# Patient Record
Sex: FEMALE | Race: BLACK OR AFRICAN AMERICAN | URBAN - METROPOLITAN AREA
[De-identification: names, ages, dates, MRNs, and addresses within clinical notes are randomized per-mention and may not be internally consistent; named-entity substitution may affect disease eponyms.]

---

## 2018-07-06 ENCOUNTER — HISTORICAL (OUTPATIENT)
Dept: ADMINISTRATIVE | Facility: HOSPITAL | Age: 63
End: 2018-07-06

## 2018-09-17 ENCOUNTER — HISTORICAL (OUTPATIENT)
Dept: ADMINISTRATIVE | Facility: HOSPITAL | Age: 63
End: 2018-09-17

## 2018-09-17 LAB
ABS NEUT (OLG): 2.5
ALBUMIN SERPL-MCNC: 4.3 GM/DL (ref 3.4–5)
ALBUMIN/GLOB SERPL: 1.79 {RATIO} (ref 1.5–2.5)
ALP SERPL-CCNC: 65 UNIT/L (ref 38–126)
ALT SERPL-CCNC: 13 UNIT/L (ref 7–52)
APPEARANCE, UA: CLEAR
AST SERPL-CCNC: 15 UNIT/L (ref 15–37)
BACTERIA #/AREA URNS AUTO: ABNORMAL /HPF
BILIRUB SERPL-MCNC: 0.4 MG/DL (ref 0.2–1)
BILIRUB UR QL STRIP: NEGATIVE MG/DL
BILIRUBIN DIRECT+TOT PNL SERPL-MCNC: 0.1 MG/DL (ref 0–0.5)
BILIRUBIN DIRECT+TOT PNL SERPL-MCNC: 0.3 MG/DL
BUN SERPL-MCNC: 18 MG/DL (ref 7–18)
CALCIUM SERPL-MCNC: 8.8 MG/DL (ref 8.5–10)
CHLORIDE SERPL-SCNC: 105 MMOL/L (ref 98–107)
CHOLEST SERPL-MCNC: 193 MG/DL (ref 0–200)
CHOLEST/HDLC SERPL: 3.6 {RATIO}
CO2 SERPL-SCNC: 29 MMOL/L (ref 21–32)
COLOR UR: YELLOW
CREAT SERPL-MCNC: 0.96 MG/DL (ref 0.6–1.3)
CREAT UR-MCNC: 100 MG/DL
DEPRECATED CALCIDIOL+CALCIFEROL SERPL-MC: 35.3 NG/ML (ref 30–80)
ERYTHROCYTE [DISTWIDTH] IN BLOOD BY AUTOMATED COUNT: 14.8 % (ref 11.5–17)
EST. AVERAGE GLUCOSE BLD GHB EST-MCNC: 117 MG/DL
GLOBULIN SER-MCNC: 2.4 GM/DL (ref 1.2–3)
GLUCOSE (UA): NEGATIVE MG/DL
GLUCOSE SERPL-MCNC: 106 MG/DL (ref 74–106)
HBA1C MFR BLD: 5.7 % (ref 4.4–6.4)
HCT VFR BLD AUTO: 40.4 % (ref 37–47)
HDLC SERPL-MCNC: 54 MG/DL (ref 35–60)
HGB BLD-MCNC: 12.8 GM/DL (ref 12–16)
HGB UR QL STRIP: ABNORMAL UNIT/L
KETONES UR QL STRIP: NEGATIVE MG/DL
LDLC SERPL CALC-MCNC: 151 MG/DL (ref 0–129)
LEUKOCYTE ESTERASE UR QL STRIP: NEGATIVE UNIT/L
LYMPHOCYTES # BLD AUTO: 2.1 X10(3)/MCL (ref 0.6–3.4)
LYMPHOCYTES NFR BLD AUTO: 41.3 % (ref 13–40)
MCH RBC QN AUTO: 27.5 PG (ref 27–31.2)
MCHC RBC AUTO-ENTMCNC: 32 GM/DL (ref 32–36)
MCV RBC AUTO: 87 FL (ref 80–94)
MICROALBUMIN UR-MCNC: 10 MG/L
MICROALBUMIN/CREAT RATIO PNL UR: <30 MG/GM
MONOCYTES # BLD AUTO: 0.4 X10(3)/MCL (ref 0–1.8)
MONOCYTES NFR BLD AUTO: 8.2 % (ref 0.1–24)
NEUTROPHILS NFR BLD AUTO: 50.5 % (ref 47–80)
NITRITE UR QL STRIP.AUTO: NEGATIVE
PH UR STRIP: 7 [PH]
PLATELET # BLD AUTO: 273 X10(3)/MCL (ref 130–400)
PMV BLD AUTO: 9.6 FL
POTASSIUM SERPL-SCNC: 3.8 MMOL/L (ref 3.5–5.1)
PROT SERPL-MCNC: 6.7 GM/DL (ref 6.4–8.2)
PROT UR QL STRIP: NEGATIVE MG/DL
RBC # BLD AUTO: 4.66 X10(6)/MCL (ref 4.2–5.4)
RBC #/AREA URNS HPF: ABNORMAL /HPF
SODIUM SERPL-SCNC: 143 MMOL/L (ref 136–145)
SP GR UR STRIP: 1.01
SQUAMOUS EPITHELIAL, UA: ABNORMAL /LPF
TRIGL SERPL-MCNC: 97 MG/DL (ref 30–150)
TSH SERPL-ACNC: 2.05 MIU/ML (ref 0.35–4.94)
UROBILINOGEN UR STRIP-ACNC: 0.2 MG/DL
VLDLC SERPL CALC-MCNC: 19.4 MG/DL
WBC # SPEC AUTO: 5 X10(3)/MCL (ref 4.5–11.5)
WBC #/AREA URNS AUTO: ABNORMAL /[HPF]

## 2019-05-20 ENCOUNTER — HISTORICAL (OUTPATIENT)
Dept: ADMINISTRATIVE | Facility: HOSPITAL | Age: 64
End: 2019-05-20

## 2019-05-20 LAB
ALBUMIN SERPL-MCNC: 4.1 GM/DL (ref 3.4–5)
ALBUMIN/GLOB SERPL: 2.16 {RATIO} (ref 1.5–2.5)
ALP SERPL-CCNC: 60 UNIT/L (ref 38–126)
ALT SERPL-CCNC: 10 UNIT/L (ref 7–52)
AST SERPL-CCNC: 12 UNIT/L (ref 15–37)
BILIRUB SERPL-MCNC: 0.4 MG/DL (ref 0.2–1)
BILIRUBIN DIRECT+TOT PNL SERPL-MCNC: 0.1 MG/DL (ref 0–0.5)
BILIRUBIN DIRECT+TOT PNL SERPL-MCNC: 0.3 MG/DL
BUN SERPL-MCNC: 20 MG/DL (ref 7–18)
CALCIUM SERPL-MCNC: 9 MG/DL (ref 8.5–10)
CHLORIDE SERPL-SCNC: 102 MMOL/L (ref 98–107)
CHOLEST SERPL-MCNC: 229 MG/DL (ref 0–200)
CHOLEST/HDLC SERPL: 4.7 {RATIO}
CO2 SERPL-SCNC: 31 MMOL/L (ref 21–32)
CREAT SERPL-MCNC: 0.78 MG/DL (ref 0.6–1.3)
DEPRECATED CALCIDIOL+CALCIFEROL SERPL-MC: 29 NG/ML (ref 30–80)
EST. AVERAGE GLUCOSE BLD GHB EST-MCNC: 111 MG/DL
GLOBULIN SER-MCNC: 1.9 GM/DL (ref 1.2–3)
GLUCOSE SERPL-MCNC: 103 MG/DL (ref 74–106)
HBA1C MFR BLD: 5.5 % (ref 4.4–6.4)
HDLC SERPL-MCNC: 49 MG/DL (ref 35–60)
LDLC SERPL CALC-MCNC: 166 MG/DL (ref 0–129)
POTASSIUM SERPL-SCNC: 4 MMOL/L (ref 3.5–5.1)
PROT SERPL-MCNC: 6 GM/DL (ref 6.4–8.2)
SODIUM SERPL-SCNC: 142 MMOL/L (ref 136–145)
TRIGL SERPL-MCNC: 109 MG/DL (ref 30–150)
VLDLC SERPL CALC-MCNC: 21.8 MG/DL

## 2019-06-06 ENCOUNTER — HISTORICAL (OUTPATIENT)
Dept: ADMINISTRATIVE | Facility: HOSPITAL | Age: 64
End: 2019-06-06

## 2019-06-06 LAB — URATE SERPL-MCNC: 7 MG/DL (ref 2.6–7.2)

## 2020-02-10 ENCOUNTER — HISTORICAL (OUTPATIENT)
Dept: ADMINISTRATIVE | Facility: HOSPITAL | Age: 65
End: 2020-02-10

## 2020-02-10 LAB
ABS NEUT (OLG): 2.4 X10(3)/MCL (ref 2.1–9.2)
ALBUMIN SERPL-MCNC: 4.1 GM/DL (ref 3.4–5)
ALBUMIN/GLOB SERPL: 1.86 {RATIO} (ref 1.5–2.5)
ALP SERPL-CCNC: 68 UNIT/L (ref 38–126)
ALT SERPL-CCNC: 11 UNIT/L (ref 7–52)
APPEARANCE, UA: CLEAR
AST SERPL-CCNC: 14 UNIT/L (ref 15–37)
BACTERIA #/AREA URNS AUTO: ABNORMAL /HPF
BILIRUB SERPL-MCNC: 0.4 MG/DL (ref 0.2–1)
BILIRUB UR QL STRIP: NEGATIVE MG/DL
BILIRUBIN DIRECT+TOT PNL SERPL-MCNC: 0.1 MG/DL (ref 0–0.5)
BILIRUBIN DIRECT+TOT PNL SERPL-MCNC: 0.3 MG/DL
BUN SERPL-MCNC: 15 MG/DL (ref 7–18)
CALCIUM SERPL-MCNC: 8.9 MG/DL (ref 8.5–10)
CHLORIDE SERPL-SCNC: 101 MMOL/L (ref 98–107)
CHOLEST SERPL-MCNC: 216 MG/DL (ref 0–200)
CHOLEST/HDLC SERPL: 3.9 {RATIO}
CO2 SERPL-SCNC: 26 MMOL/L (ref 21–32)
COLOR UR: YELLOW
CREAT SERPL-MCNC: 0.98 MG/DL (ref 0.6–1.3)
DEPRECATED CALCIDIOL+CALCIFEROL SERPL-MC: 42.8 NG/ML (ref 30–80)
ERYTHROCYTE [DISTWIDTH] IN BLOOD BY AUTOMATED COUNT: 15.2 % (ref 11.5–17)
EST. AVERAGE GLUCOSE BLD GHB EST-MCNC: 117 MG/DL
GLOBULIN SER-MCNC: 2.2 GM/DL (ref 1.2–3)
GLUCOSE (UA): NEGATIVE MG/DL
GLUCOSE SERPL-MCNC: 103 MG/DL (ref 74–106)
HBA1C MFR BLD: 5.7 % (ref 4.4–6.4)
HCT VFR BLD AUTO: 40.9 % (ref 37–47)
HDLC SERPL-MCNC: 55 MG/DL (ref 35–60)
HGB BLD-MCNC: 13.4 GM/DL (ref 12–16)
HGB UR QL STRIP: ABNORMAL UNIT/L
KETONES UR QL STRIP: NEGATIVE MG/DL
LDLC SERPL CALC-MCNC: 156 MG/DL (ref 0–129)
LEUKOCYTE ESTERASE UR QL STRIP: NEGATIVE UNIT/L
LYMPHOCYTES # BLD AUTO: 1.7 X10(3)/MCL (ref 0.6–3.4)
LYMPHOCYTES NFR BLD AUTO: 37.8 % (ref 13–40)
MCH RBC QN AUTO: 26.7 PG (ref 27–31.2)
MCHC RBC AUTO-ENTMCNC: 33 GM/DL (ref 32–36)
MCV RBC AUTO: 82 FL (ref 80–94)
MONOCYTES # BLD AUTO: 0.4 X10(3)/MCL (ref 0.1–1.3)
MONOCYTES NFR BLD AUTO: 9.8 % (ref 0.1–24)
NEUTROPHILS NFR BLD AUTO: 52.4 % (ref 47–80)
NITRITE UR QL STRIP.AUTO: NEGATIVE
PH UR STRIP: 6 [PH]
PLATELET # BLD AUTO: 284 X10(3)/MCL (ref 130–400)
PMV BLD AUTO: 9.9 FL (ref 9.4–12.4)
POTASSIUM SERPL-SCNC: 3.5 MMOL/L (ref 3.5–5.1)
PROT SERPL-MCNC: 6.3 GM/DL (ref 6.4–8.2)
PROT UR QL STRIP: NEGATIVE MG/DL
RBC # BLD AUTO: 5.02 X10(6)/MCL (ref 4.2–5.4)
RBC #/AREA URNS HPF: ABNORMAL /HPF
SODIUM SERPL-SCNC: 140 MMOL/L (ref 136–145)
SP GR UR STRIP: 1.01
SQUAMOUS EPITHELIAL, UA: ABNORMAL /LPF
TRIGL SERPL-MCNC: 132 MG/DL (ref 30–150)
TSH SERPL-ACNC: 2 MIU/ML (ref 0.35–4.94)
UROBILINOGEN UR STRIP-ACNC: 0.2 MG/DL
VLDLC SERPL CALC-MCNC: 26.4 MG/DL
WBC # SPEC AUTO: 4.5 X10(3)/MCL (ref 4.5–11.5)
WBC #/AREA URNS AUTO: ABNORMAL /[HPF]

## 2020-03-20 ENCOUNTER — HISTORICAL (OUTPATIENT)
Dept: ADMINISTRATIVE | Facility: HOSPITAL | Age: 65
End: 2020-03-20

## 2020-03-20 LAB
ALBUMIN SERPL-MCNC: 3.8 GM/DL (ref 3.4–5)
ALBUMIN/GLOB SERPL: 1.65 {RATIO} (ref 1.5–2.5)
ALP SERPL-CCNC: 54 UNIT/L (ref 38–126)
ALT SERPL-CCNC: 10 UNIT/L (ref 7–52)
AST SERPL-CCNC: 15 UNIT/L (ref 15–37)
BILIRUB SERPL-MCNC: 0.4 MG/DL (ref 0.2–1)
BILIRUBIN DIRECT+TOT PNL SERPL-MCNC: 0.1 MG/DL (ref 0–0.5)
BILIRUBIN DIRECT+TOT PNL SERPL-MCNC: 0.3 MG/DL
BUN SERPL-MCNC: 16 MG/DL (ref 7–18)
CALCIUM SERPL-MCNC: 8.9 MG/DL (ref 8.5–10)
CHLORIDE SERPL-SCNC: 103 MMOL/L (ref 98–107)
CO2 SERPL-SCNC: 29 MMOL/L (ref 21–32)
CREAT SERPL-MCNC: 0.94 MG/DL (ref 0.6–1.3)
GLOBULIN SER-MCNC: 2.3 GM/DL (ref 1.2–3)
GLUCOSE SERPL-MCNC: 101 MG/DL (ref 74–106)
POTASSIUM SERPL-SCNC: 4.1 MMOL/L (ref 3.5–5.1)
PROT SERPL-MCNC: 6.1 GM/DL (ref 6.4–8.2)
SODIUM SERPL-SCNC: 139 MMOL/L (ref 136–145)
URATE SERPL-MCNC: 5.9 MG/DL (ref 2.6–7.2)

## 2020-08-04 ENCOUNTER — HISTORICAL (OUTPATIENT)
Dept: ADMINISTRATIVE | Facility: HOSPITAL | Age: 65
End: 2020-08-04

## 2020-08-04 LAB
ALBUMIN SERPL-MCNC: 4.3 GM/DL (ref 3.4–5)
ALBUMIN/GLOB SERPL: 1.87 {RATIO} (ref 1.5–2.5)
ALP SERPL-CCNC: 77 UNIT/L (ref 38–126)
ALT SERPL-CCNC: 11 UNIT/L (ref 7–52)
AST SERPL-CCNC: 18 UNIT/L (ref 15–37)
BILIRUB SERPL-MCNC: 0.4 MG/DL (ref 0.2–1)
BILIRUBIN DIRECT+TOT PNL SERPL-MCNC: 0.1 MG/DL (ref 0–0.5)
BILIRUBIN DIRECT+TOT PNL SERPL-MCNC: 0.3 MG/DL
BUN SERPL-MCNC: 15 MG/DL (ref 7–18)
CALCIUM SERPL-MCNC: 9.1 MG/DL (ref 8.5–10)
CHLORIDE SERPL-SCNC: 102 MMOL/L (ref 98–107)
CHOLEST SERPL-MCNC: 246 MG/DL (ref 0–200)
CHOLEST/HDLC SERPL: 4.6 {RATIO}
CO2 SERPL-SCNC: 28 MMOL/L (ref 21–32)
CREAT SERPL-MCNC: 0.84 MG/DL (ref 0.6–1.3)
DEPRECATED CALCIDIOL+CALCIFEROL SERPL-MC: 40.2 NG/ML (ref 30–80)
EST. AVERAGE GLUCOSE BLD GHB EST-MCNC: 114 MG/DL
GLOBULIN SER-MCNC: 2.3 GM/DL (ref 1.2–3)
GLUCOSE SERPL-MCNC: 100 MG/DL (ref 74–106)
HBA1C MFR BLD: 5.6 % (ref 4.4–6.4)
HDLC SERPL-MCNC: 54 MG/DL (ref 35–60)
LDLC SERPL CALC-MCNC: 161 MG/DL (ref 0–129)
POTASSIUM SERPL-SCNC: 3.6 MMOL/L (ref 3.5–5.1)
PROT SERPL-MCNC: 6.6 GM/DL (ref 6.4–8.2)
SODIUM SERPL-SCNC: 144 MMOL/L (ref 136–145)
TRIGL SERPL-MCNC: 203 MG/DL (ref 30–150)
VLDLC SERPL CALC-MCNC: 40.6 MG/DL

## 2021-02-10 ENCOUNTER — HISTORICAL (OUTPATIENT)
Dept: ADMINISTRATIVE | Facility: HOSPITAL | Age: 66
End: 2021-02-10

## 2021-02-10 LAB
ABS NEUT (OLG): 2.3 X10(3)/MCL (ref 2.1–9.2)
ALBUMIN SERPL-MCNC: 4.3 GM/DL (ref 3.4–5)
ALBUMIN/GLOB SERPL: 1.95 {RATIO} (ref 1.5–2.5)
ALP SERPL-CCNC: 67 UNIT/L (ref 38–126)
ALT SERPL-CCNC: 8 UNIT/L (ref 7–52)
APPEARANCE, UA: CLEAR
AST SERPL-CCNC: 15 UNIT/L (ref 15–37)
BACTERIA #/AREA URNS AUTO: NORMAL /HPF
BILIRUB SERPL-MCNC: 0.5 MG/DL (ref 0.2–1)
BILIRUB UR QL STRIP: NEGATIVE MG/DL
BILIRUBIN DIRECT+TOT PNL SERPL-MCNC: 0.1 MG/DL (ref 0–0.5)
BILIRUBIN DIRECT+TOT PNL SERPL-MCNC: 0.4 MG/DL
BUN SERPL-MCNC: 19 MG/DL (ref 7–18)
CALCIUM SERPL-MCNC: 9.6 MG/DL (ref 8.5–10)
CHLORIDE SERPL-SCNC: 102 MMOL/L (ref 98–107)
CHOLEST SERPL-MCNC: 225 MG/DL (ref 0–200)
CHOLEST/HDLC SERPL: 4.5 {RATIO}
CO2 SERPL-SCNC: 34 MMOL/L (ref 21–32)
COLOR UR: YELLOW
CREAT SERPL-MCNC: 0.9 MG/DL (ref 0.6–1.3)
DEPRECATED CALCIDIOL+CALCIFEROL SERPL-MC: 48.2 NG/ML (ref 30–80)
ERYTHROCYTE [DISTWIDTH] IN BLOOD BY AUTOMATED COUNT: 15.1 % (ref 11.5–17)
EST. AVERAGE GLUCOSE BLD GHB EST-MCNC: 108 MG/DL
GLOBULIN SER-MCNC: 1.9 GM/DL (ref 1.2–3)
GLUCOSE (UA): NEGATIVE MG/DL
GLUCOSE SERPL-MCNC: 99 MG/DL (ref 74–106)
HBA1C MFR BLD: 5.4 % (ref 4.4–6.4)
HCT VFR BLD AUTO: 39 % (ref 37–47)
HDLC SERPL-MCNC: 50 MG/DL (ref 35–60)
HGB BLD-MCNC: 13.2 GM/DL (ref 12–16)
HGB UR QL STRIP: NEGATIVE UNIT/L
KETONES UR QL STRIP: NEGATIVE MG/DL
LDLC SERPL CALC-MCNC: 163 MG/DL (ref 0–129)
LEUKOCYTE ESTERASE UR QL STRIP: NEGATIVE UNIT/L
LYMPHOCYTES # BLD AUTO: 1.4 X10(3)/MCL (ref 0.6–3.4)
LYMPHOCYTES NFR BLD AUTO: 35.7 % (ref 13–40)
MCH RBC QN AUTO: 28.1 PG (ref 27–31.2)
MCHC RBC AUTO-ENTMCNC: 34 GM/DL (ref 32–36)
MCV RBC AUTO: 83 FL (ref 80–94)
MONOCYTES # BLD AUTO: 0.3 X10(3)/MCL (ref 0.1–1.3)
MONOCYTES NFR BLD AUTO: 8.4 % (ref 0.1–24)
NEUTROPHILS NFR BLD AUTO: 55.9 % (ref 47–80)
NITRITE UR QL STRIP.AUTO: NEGATIVE
PH UR STRIP: 7 [PH]
PLATELET # BLD AUTO: 266 X10(3)/MCL (ref 130–400)
PMV BLD AUTO: 9.8 FL (ref 9.4–12.4)
POTASSIUM SERPL-SCNC: 4.1 MMOL/L (ref 3.5–5.1)
PROT SERPL-MCNC: 6.5 GM/DL (ref 6.4–8.2)
PROT UR QL STRIP: NEGATIVE MG/DL
RBC # BLD AUTO: 4.69 X10(6)/MCL (ref 4.2–5.4)
RBC #/AREA URNS HPF: NORMAL /HPF
SODIUM SERPL-SCNC: 143 MMOL/L (ref 136–145)
SP GR UR STRIP: 1.02
SQUAMOUS EPITHELIAL, UA: NORMAL /LPF
TRIGL SERPL-MCNC: 164 MG/DL (ref 30–150)
TSH SERPL-ACNC: 1.64 MIU/ML (ref 0.35–4.94)
UROBILINOGEN UR STRIP-ACNC: 0.2 MG/DL
VLDLC SERPL CALC-MCNC: 32.8 MG/DL
WBC # SPEC AUTO: 4 X10(3)/MCL (ref 4.5–11.5)
WBC #/AREA URNS AUTO: NORMAL /[HPF]

## 2021-07-12 ENCOUNTER — HISTORICAL (OUTPATIENT)
Dept: ADMINISTRATIVE | Facility: HOSPITAL | Age: 66
End: 2021-07-12

## 2021-07-12 LAB
APPEARANCE, UA: CLEAR
BACTERIA #/AREA URNS AUTO: ABNORMAL /HPF
BILIRUB UR QL STRIP: NEGATIVE MG/DL
COLOR UR: YELLOW
GLUCOSE (UA): NEGATIVE MG/DL
HGB UR QL STRIP: ABNORMAL UNIT/L
KETONES UR QL STRIP: NEGATIVE MG/DL
LEUKOCYTE ESTERASE UR QL STRIP: NEGATIVE UNIT/L
NITRITE UR QL STRIP.AUTO: NEGATIVE
PH UR STRIP: 5.5 [PH]
PROT UR QL STRIP: NEGATIVE MG/DL
RBC #/AREA URNS HPF: ABNORMAL /HPF
SP GR UR STRIP: 1.01
SQUAMOUS EPITHELIAL, UA: ABNORMAL /LPF
UROBILINOGEN UR STRIP-ACNC: 0.2 MG/DL
WBC #/AREA URNS AUTO: ABNORMAL /[HPF]

## 2021-07-14 LAB — FINAL CULTURE: NORMAL

## 2021-08-11 ENCOUNTER — HISTORICAL (OUTPATIENT)
Dept: ADMINISTRATIVE | Facility: HOSPITAL | Age: 66
End: 2021-08-11

## 2021-08-11 LAB
CHOLEST SERPL-MCNC: 238 MG/DL (ref 0–200)
CHOLEST/HDLC SERPL: 4.2 {RATIO}
DEPRECATED CALCIDIOL+CALCIFEROL SERPL-MC: 42.7 NG/ML (ref 30–80)
EST. AVERAGE GLUCOSE BLD GHB EST-MCNC: 108 MG/DL
HBA1C MFR BLD: 5.4 % (ref 4.4–6.4)
HDLC SERPL-MCNC: 56 MG/DL (ref 35–60)
LDLC SERPL CALC-MCNC: 144 MG/DL (ref 0–129)
TRIGL SERPL-MCNC: 186 MG/DL (ref 30–150)
VLDLC SERPL CALC-MCNC: 37.2 MG/DL

## 2021-12-29 ENCOUNTER — HISTORICAL (OUTPATIENT)
Dept: ADMINISTRATIVE | Facility: HOSPITAL | Age: 66
End: 2021-12-29

## 2021-12-29 LAB — SARS-COV-2 RNA RESP QL NAA+PROBE: NOT DETECTED

## 2022-04-05 ENCOUNTER — HISTORICAL (OUTPATIENT)
Dept: ADMINISTRATIVE | Facility: HOSPITAL | Age: 67
End: 2022-04-05

## 2022-04-09 ENCOUNTER — HISTORICAL (OUTPATIENT)
Dept: ADMINISTRATIVE | Facility: HOSPITAL | Age: 67
End: 2022-04-09

## 2022-04-27 VITALS
BODY MASS INDEX: 34.8 KG/M2 | SYSTOLIC BLOOD PRESSURE: 126 MMHG | WEIGHT: 189.13 LBS | DIASTOLIC BLOOD PRESSURE: 72 MMHG | HEIGHT: 62 IN

## 2022-05-02 NOTE — HISTORICAL [facility] CERNER
This is a historical note converted from [facility]. Formatting and pictures may have been removed.  Please reference [facility] for original formatting and attached multimedia. Chief Complaint  WELLNESS/FASTING  History of Present Illness  Patient is here today for wellness CPX.? She has not checked her blood sugars lately?because she needs?a new glucometer.? Prescription was given today.? Her last GYN and mammogram were over 10 years ago,?she reports that she will schedule it on her own.? She is aware the risk?of not having these evaluated.  ?  She also has a history of sleep apnea however her machine is broken and she would like to have another sleep study?completed?and consider getting a new machine.? She does snore and has excessive daytime sleepiness since not using her?CPAP.? She is tolerating all medications except?She would like to?return to [facility] because the oxybutynin is not helping?with her overactive bladder symptoms.? .  Review of Systems  GENERAL:??no?unexplained wtloss, fever, + fatigue, chills, night sweats or weakness  HEENT: no? sore throat, ear pain, sinus pressure, nasal congestion, or rhinorrhea? +AR  VISION:?no vision changes, glaucoma, cataracts, reading glasses,  CARDIAC: no?chest pain,?palpitations,?Dyspnea on exertion,?orthopnea, sees [facility]ology  RESPIRATORY:?no?cough,?wheezing, sputum production,or?SOB  GI: no??abdominal pain,?n&v, constipation,?diarrhea,??blood in stool or_? no? family history of colon cancer_  :?+? dysuria, hematuria, + frequency, ?urgency, incontinence,? vaginal discharge,? abn. vaginal bleeding  MUSC/SKEL:??right neck?myalgia, no ?weakness, edema,? arthralgia, or?joint effusion  SKIN:? No?rash, hives, ?itching or ?sores  NEURO:? No headaches, numbness, ?tingling, weakness, or?dizziness  PSYCH:? No anxiety, ?depression, ?irritability, ?suicidal ideation or?hallucinations  ENDO:? No polyuria, polydipsia, ?polyphagia  HEME:? No?Bruising, ?lymphadenopathy, bleeding  disorders ?or?signs of anemia  Physical Exam  Vitals & Measurements  HR:?78(Peripheral)? BP:?98/68?  HT:?158.7?cm? HT:?158.7?cm? WT:?90.4?kg? WT:?90.4?kg? BMI:?35.89?  GENERAL: NAD, alert and oriented x 3  SKIN:? no rash or abnormal appearing skin lesions  HEENT:? PERRLA, EOMI, mouth wnl, throat wnl, EAC and TM wnl bilaterally  NECK:? FROM, no lymphadenopathy, no thyroid abnormalities palpable  CHEST:? CTA bilaterally no wheezes, crackles or rubs  CARDIAC:? RRR, no murmurs audible  ABDOMEN:? Soft, nontender, nondistended, NBSx4,?no rebound or guarding, no HSM  EXTREMITIES:? no clubbing, cyanosis, or edema.? joints wnl. +2 DP/PT pulse bilaterally  NEURO:? no sensory or motor deficits noted. CN II-XII intact. Gait wnl.?  GENITAL: defer to gyn?(way overdue by 10 years)?  Assessment/Plan  1.?Wellness examination  decline prevnar and high dose flu shot, Begin ASA 81 mg q d, ?CBC, CMP, FLP, U/A, TSH, A1C, Microalbumin, vit D level.? GYN/MMG overdue 10 yrs --pt will set up?GYN visit on her own, she wants to check with her insurance to see where she can go for a mammogram. ?She will call me if she wants to schedule this., ?Colonoscopy 12/13 due 12/18,?encourage patient to increase cardiovascular exercise?and attempt to obtain at least 150 minutes of moderate aerobic exercise per week  Ordered:  CBC w/ Auto Diff, Routine collect, 09/17/18 9:44:00 CDT, Blood, Order for future visit, Stop date 09/17/18 9:44:00 CDT, Lab Collect, Wellness examination, 09/17/18 9:44:00 CDT  Comprehensive Metabolic Panel, Routine collect, 09/17/18 9:44:00 CDT, Blood, Order for future visit, Stop date 09/17/18 9:44:00 CDT, Lab Collect, Wellness examination, 09/17/18 9:44:00 CDT  Lab Collection Request, 09/17/18 9:44:00 CDT, [facility], 09/17/18 9:44:00 CDT  Lipid Panel, Routine collect, 09/17/18 9:44:00 CDT, Blood, Order for future visit, Stop date 09/17/18 9:44:00 CDT, Lab Collect, Wellness examination, 09/17/18 9:44:00 CDT  Preventative  Health Care Est 65 yrs and over [id number] PC, Wellness examination  DM (diabetes mellitus)  HYPERTENSION  Hypercholesterolemia  Vitamin D deficiency  Overactive bladder, [facility], 09/17/18 9:44:00 CDT  Urinalysis Complete no reflex, Routine collect, Urine, Order for future visit, 09/17/18 9:44:00 CDT, Stop date 09/17/18 9:44:00 CDT, Nurse collect, Wellness examination  Overactive bladder  ?  2.?Sleep apnea  [facility], snoring + hx sleep apnea, excessive daytime fatigue.? Current CPAP machine broken.  Ordered:  Office/Outpatient Visit Level 3 Established [id number] PC, Sleep apnea  Excessive daytime sleepiness  Snoring  Overactive bladder, [facility], 09/17/18 9:51:00 CDT  Schedule Diagnostics Study, sleep study, [facility], 09/17/18 9:44:00 CDT, Sleep apnea  Snoring  Excessive daytime sleepiness  Thyroid Stimulating Hormone, Routine collect, 09/17/18 9:44:00 CDT, Blood, Order for future visit, Stop date 09/17/18 9:44:00 CDT, Lab Collect, DM (diabetes mellitus)  HYPERTENSION  Sleep apnea, 09/17/18 9:44:00 CDT  ?  3.?Overactive bladder  ?D/c oxybutynin 5 mg bid(failed), begin vesicare 10 mg  Ordered:  Clinic Follow up, *Est. 03/17/19 3:00:00 CDT, Order for future visit, DM (diabetes mellitus)  HYPERTENSION  Hypercholesterolemia  Vitamin D deficiency  Overactive bladder, [facility]  Office/Outpatient Visit Level 3 Established [id number] PC, Sleep apnea  Excessive daytime sleepiness  Snoring  Overactive bladder, [facility], 09/17/18 9:51:00 CDT  Preventative Health Care Est 65 yrs and over [address]2568 PC, Wellness examination  DM (diabetes mellitus)  HYPERTENSION  Hypercholesterolemia  Vitamin D deficiency  Overactive bladder, [facility], 09/17/18 9:44:00 CDT  Urinalysis Complete no reflex, Routine collect, Urine, Order for future visit, 09/17/18 9:44:00 CDT, Stop date 09/17/18 9:44:00 CDT, Nurse collect, Wellness examination  Overactive  bladder  ?  4.?DM (diabetes mellitus)  ?Metformin 1000 mg bid, Eye exam?? up-to-date per patient,  Ordered:  Clinic Follow up, *Est. 03/17/19 3:00:00 CDT, Order for future visit, DM (diabetes mellitus)  HYPERTENSION  Hypercholesterolemia  Vitamin D deficiency  Overactive bladder, [facility]  Hemoglobin A1c, Routine collect, 09/17/18 9:44:00 CDT, Blood, Order for future visit, Stop date 09/17/18 9:44:00 CDT, Lab Collect, DM (diabetes mellitus), 09/17/18 9:44:00 CDT  Microalbumin Urine, Routine collect, Urine, Order for future visit, 09/17/18 9:44:00 CDT, Stop date 09/17/18 9:44:00 CDT, Nurse collect, DM (diabetes mellitus)  Preventative Health Care Est 65 yrs and over [id number] PC, Wellness examination  DM (diabetes mellitus)  HYPERTENSION  Hypercholesterolemia  Vitamin D deficiency  Overactive bladder, [facility], 09/17/18 9:44:00 CDT  Thyroid Stimulating Hormone, Routine collect, 09/17/18 9:44:00 CDT, Blood, Order for future visit, Stop date 09/17/18 9:44:00 CDT, Lab Collect, DM (diabetes mellitus)  HYPERTENSION  Sleep apnea, 09/17/18 9:44:00 CDT  ?  5.?HYPERTENSION  ?Benazepril hct 20/25 BID  Ordered:  Clinic Follow up, *Est. 03/17/19 3:00:00 CDT, Order for future visit, DM (diabetes mellitus)  HYPERTENSION  Hypercholesterolemia  Vitamin D deficiency  Overactive bladder, [facility]  Preventative Health Care Est 65 yrs and over [address]3467 PC, Wellness examination  DM (diabetes mellitus)  HYPERTENSION  Hypercholesterolemia  Vitamin D deficiency  Overactive bladder, [facility], 09/17/18 9:44:00 CDT  Thyroid Stimulating Hormone, Routine collect, 09/17/18 9:44:00 CDT, Blood, Order for future visit, Stop date 09/17/18 9:44:00 CDT, Lab Collect, DM (diabetes mellitus)  HYPERTENSION  Sleep apnea, 09/17/18 9:44:00 CDT  ?  6.?Hypercholesterolemia  Continue pravastatin 10 mg  Ordered:  Clinic Follow up, *Est. 03/17/19 3:00:00 CDT, Order for future visit, DM (diabetes mellitus)  HYPERTENSION   Hypercholesterolemia  Vitamin D deficiency  Overactive bladder, [facility]  Preventative Health Care Est 65 yrs and over [address]32 PC, Wellness examination  DM (diabetes mellitus)  HYPERTENSION  Hypercholesterolemia  Vitamin D deficiency  Overactive bladder, [facility], 09/17/18 9:44:00 CDT  ?  7.?Vitamin D deficiency  ?check vit d level  Ordered:  Clinic Follow up, *Est. 03/17/19 3:00:00 CDT, Order for future visit, DM (diabetes mellitus)  HYPERTENSION  Hypercholesterolemia  Vitamin D deficiency  Overactive bladder, [facility]  Preventative Health Care Est 65 yrs and over [address]55 PC, Wellness examination  DM (diabetes mellitus)  HYPERTENSION  Hypercholesterolemia  Vitamin D deficiency  Overactive bladder, [facility], 09/17/18 9:44:00 CDT  Vitamin D, 25-Hydroxy Level, Routine collect, 09/17/18 9:44:00 CDT, Blood, Order for future visit, Stop date 09/17/18 9:44:00 CDT, Lab Collect, Vitamin D deficiency, 09/17/18 9:44:00 CDT  ?  Excessive daytime sleepiness  Ordered:  Office/Outpatient Visit Level 3 Established [id number] PC, Sleep apnea  Excessive daytime sleepiness  Snoring  Overactive bladder, [facility], 09/17/18 9:51:00 CDT  Schedule Diagnostics Study, sleep study, [facility], 09/17/18 9:44:00 CDT, Sleep apnea  Snoring  Excessive daytime sleepiness  ?  Snoring  Ordered:  Office/Outpatient Visit Level 3 Established 17447 PC, Sleep apnea  Excessive daytime sleepiness  Snoring  Overactive bladder, [facility], 09/17/18 9:51:00 CDT  Schedule Diagnostics Study, sleep study, [facility], 09/17/18 9:44:00 CDT, Sleep apnea  Snoring  Excessive daytime sleepiness  ?  Orders:  aspirin, 81 mg = 1 tab(s), Oral, Daily, # 30 tab(s), 11 Refill(s), other reason (Rx)  pravastatin, 10 mg = 1 tab(s), Oral, Once a day (at bedtime), # 90 tab(s), 2 Refill(s), Pharmacy: [facility]/pharmacy #[id number]  solifenacin, 10 mg = 1 tab(s), Oral, Daily, # 90 tab(s), 3 Refill(s), Pharmacy: [facility]/pharmacy #[id number]    Problem List/Past Medical History  Ongoing  Acute UTI  Depression  DM (diabetes mellitus)  GERD - Gastro-esophageal reflux disease  Hypercholesterolemia  HYPERTENSION  Knowledge deficit  Sleep apnea  Urinary incontinence  Vitamin D deficiency  Wellness examination  Historical  Allergic rhinitis  Obstructive sleep apnea of adult  Overactive bladder  Procedure/Surgical History  Colonoscopy (12/2013)  Tonsillectomy (1970)  Bilateral tubal ligation  Coronary angiogram   Medications  aspirin 81 mg oral tablet, 81 mg= 1 tab(s), Oral, Daily, 11 refills  benazepril-hydrochlorothiazide 20 mg-25 mg oral tablet, 1 tab, Oral, BID, 1 refills  metFORMIN 1000 mg oral tablet, 1000 mg, Oral, BID, 1 refills  Misc Prescription, See Instructions, 11 refills  pravastatin 10 mg oral tablet, 10 mg= 1 tab(s), Oral, Once a day (at bedtime), 2 refills  VESIcare 10 mg oral tablet, 10 mg= 1 tab(s), Oral, Daily, 3 refills  Allergies  No Known Allergies  Social History  Alcohol - Low Risk, 12/02/2012  Current, 1-2 times per month, 03/06/2018  Employment/School  Work/School description: homemaker., 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  Atrial fibrillation: Mother.  Diabetes mellitus: Mother and Daughter.  Gastric bypass: Daughter.  Osteoarthritis: Son.  Pacemaker rhythm: Mother.  Immunizations  Vaccine Date Status   zoster vaccine live 06/2014 Recorded   pneumococcal 23-polyvalent vaccine 01/2013 Recorded   Health Maintenance  Health Maintenance  ???Pending?(in the next year)  ??? ??OverDue  ??? ? ? ?Pneumococcal Vaccine due??and every?  ??? ? ? ?Hypertension Management-BMP due??08/04/16??and every 1??year(s)  ??? ? ? ?Diabetes Maintenance-Serum Creatinine due??08/05/16??and every 1??year(s)  ??? ??Due?  ??? ? ? ?ADL Screening due??09/17/18??and every  1??year(s)  ??? ? ? ?Advance Directive due??09/17/18??and every 1??year(s)  ??? ? ? ?Alcohol Misuse Screening due??09/17/18??and every 1??year(s)  ??? ? ? ?Bone Density Screening due??09/17/18??Variable frequency  ??? ? ? ?Breast Cancer Screening ([facility]) due??09/17/18??and every?  ??? ? ? ?Cognitive Screening due??09/17/18??and every 1??year(s)  ??? ? ? ?Diabetes Maintenance-Microalbumin due??09/17/18??Variable frequency  ??? ? ? ?Diabetes Maintenance-Eye Exam due??09/17/18??and every?  ??? ? ? ?Diabetes Maintenance-HgbA1c due??09/17/18??and every?  ??? ? ? ?Diabetes Maintenance-Fasting Lipid Profile due??09/17/18??and every?  ??? ? ? ?Fall Risk Assessment due??09/17/18??and every 1??year(s)  ??? ? ? ?Functional Assessment due??09/17/18??and every 1??year(s)  ??? ? ? ?Geriatric Depression Screening due??09/17/18??and every 1??year(s)  ??? ? ? ?Pneumococcal Vaccine due??09/17/18??Variable frequency  ??? ? ? ?Tetanus Vaccine due??09/17/18??and every 10??year(s)  ??? ??Due In Future?  ??? ? ? ?Diabetes Maintenance-Urine Dipstick not due until??09/20/18??and every 1??year(s)  ??? ? ? ?Diabetes Maintenance-Foot Exam not due until??03/06/19??and every 1??year(s)  ??? ? ? ?Diabetes Maintenance-Medication Prescribed not due until??03/06/19??and every 1??year(s)  ???Satisfied?(in the past 1 year)  ??? ??Satisfied?  ??? ? ? ?Aspirin Therapy for CVD Prevention on??09/17/18.??Satisfied by Nabil Persaud MD  ??? ? ? ?Blood Pressure Screening on??09/17/18.??Satisfied by Marisela Puga  ??? ? ? ?Body Mass Index Check on??09/17/18.??Satisfied by Marisela Puga  ??? ? ? ?Diabetes Maintenance-Foot Exam on??03/06/18.??Satisfied by Nabil Persaud MD  ??? ? ? ?Diabetes Maintenance-HgbA1c on??09/17/18.??Satisfied by Nabil Persaud MD  ??? ? ? ?Diabetes Maintenance-Medication Prescribed on??03/06/18.??Satisfied by Nabil Persaud MD  ??? ? ? ?Diabetes Maintenance-Urine Dipstick on??09/20/17.??Satisfied by Nabil Persaud MD  S  ??? ? ? ?Diabetes Screening on??09/17/18.??Satisfied by Nabil Persaud MD  ??? ? ? ?Hypertension Management-Blood Pressure on??09/17/18.??Satisfied by Marisela Puga  ??? ? ? ?Influenza Vaccine on??09/17/18.??Satisfied by Marisela Puga  ??? ? ? ?Lipid Screening on??09/17/18.??Satisfied by Nabil Persaud MD  ??? ? ? ?Obesity Screening on??09/17/18.??Satisfied by Marisela Puga  ?  ?

## 2022-05-02 NOTE — HISTORICAL [facility] CERNER
This is a historical note converted from Cel[facility]. Formatting and pictures may have been removed.  Please reference Celina for original formatting and attached multimedia. Chief Complaint  6MTH RC/FASTING. PT ASKING TO DECREASE VESICARE TO 5MG.  History of Present Illness  Patient is here today for 6-month recheck diabetes/hypertension/hypercholesterolemia/overactive bladder?and low vitamin D.? She is tolerating all medications without side effects with the exception of?Vesicare. ?She feels that the dose is too high would like to decrease down to the 5 mg. ?She does have periodic?urine retention. ?She is only taking her glipizide as needed when she eats a high carbohydrate meal.? Her last A1c was well-controlled at 5.6.? Her blood pressure is stable.? She did have an episode of?left?first second and third toe?swelling and tenderness?last week.? This is since resolved. ?He did sound somewhat like gout.  ?  Patient has a history of sleep apnea and was on CPAP previously. ?She has not had a sleep study in over 20 years. ?She does have snoring and?daytime fatigue. ?She would like to be referred to a sleep clinic?for retesting.  Review of Systems  General:???+ ?[id number] ?_  HEENT:???novision changes,? dm eye exam?less than 1 yr ago  CARDIAC:?no?chest pain, SOB,  GI:?no?diarrhea,?no?n&v  ENDOCRINE:?nopolyuria,no?polydipsia,?no?polyphagia  EXT:?no?signs of neuropathy, had episode of pain to?great toe, second and third toe?last week with mild swelling and erythema, sensitive to touch? ?Gout  Physical Exam  Vitals & Measurements  HR:?64(Peripheral)? BP:?128/78?  HT:?158.7?cm? WT:?89.3?kg? BMI:?35.46?  GENERAL:?? alert and oriented x4  HEENT:? PERRLA, mouth and throat clear, mucous membranes moist  CHEST:? CTA bilaterally  CARDIAC:? RRR no murmurs audible  EXT:?no? ?edema to lower extremities? ?bilaterally,?  Assessment/Plan  1.?DM (diabetes mellitus)?E11.9  D/C ASA--decline Prevnar???---Last A1C ?5.7, microalbumin neg,  continue metformin 1000 bid, Dm foot exam today, Dm eye exam - get report Dr Spain ,  Ordered:  Clinic Follow up, *Est. 11/20/19 3:00:00 CST, Order for future visit, Wellness examination  DM (diabetes mellitus)  Hypercholesterolemia  HYPERTENSION  Overactive bladder  Vitamin D deficiency  Sleep apnea, [facility]  Comprehensive Metabolic Panel, Routine collect, 05/20/19 9:44:00 CDT, Blood, Order for future visit, Stop date 05/20/19 9:44:00 CDT, Lab Collect, DM (diabetes mellitus), 05/20/19 9:44:00 CDT  Hemoglobin A1c, Routine collect, 05/20/19 9:44:00 CDT, Blood, Order for future visit, Stop date 05/20/19 9:44:00 CDT, Lab Collect, DM (diabetes mellitus), 05/20/19 9:44:00 CDT  Lab Collection Request, 05/20/19 9:44:00 CDT, HLINK AMB - [facility], 05/20/19 9:44:00 CDT  Office/Outpatient Visit Level 4 Established [id number] PC, DM (diabetes mellitus)  Hypercholesterolemia  HYPERTENSION  Overactive bladder  Vitamin D deficiency  Sleep apnea, [facility] AMB - [facility], 05/20/19 9:45:00 CDT  ?  2.?Hypercholesterolemia?E78.00  ?continue pravastatin 10 mg  Ordered:  Clinic Follow up, *Est. 11/20/19 3:00:00 CST, Order for future visit, Wellness examination  DM (diabetes mellitus)  Hypercholesterolemia  HYPERTENSION  Overactive bladder  Vitamin D deficiency  Sleep apnea, [facility]  Lipid Panel, Routine collect, 05/20/19 9:44:00 CDT, Blood, Order for future visit, Stop date 05/20/19 9:44:00 CDT, Lab Collect, Hypercholesterolemia, 05/20/19 9:44:00 CDT  Office/Outpatient Visit Level 4 Established [id number] PC, DM (diabetes mellitus)  Hypercholesterolemia  HYPERTENSION  Overactive bladder  Vitamin D deficiency  Sleep apnea, [facility] AM[facility] - [facility], 05/20/19 9:45:00 CDT  ?  3.?HYPERTENSION?I10  ?Continue Lisinopril hct 20/25 bid and metoprolol 100 mg bid  Ordered:  Clinic Follow up, *Est. 11/20/19 3:00:00 CST, Order for future visit, Wellness examination  DM (diabetes mellitus)  Hypercholesterolemia  HYPERTENSION  Overactive bladder   Vitamin D deficiency  Sleep apnea, [facility]  Office/Outpatient Visit Level 4 Established [id number] PC, DM (diabetes mellitus)  Hypercholesterolemia  HYPERTENSION  Overactive bladder  Vitamin D deficiency  Sleep apnea, INK AMB - [facility], 05/20/19 9:45:00 CDT  Schedule Diagnostics Study, sleep study, dr [facility], 05/20/19 9:48:00 CDT, Sleep apnea  HYPERTENSION  ?  4.?Overactive bladder?N32.81  ?decrease to ?vesicare 5 mg due to occasional retention  Ordered:  Clinic Follow up, *Est. 11/20/19 3:00:00 CST, Order for future visit, Wellness examination  DM (diabetes mellitus)  Hypercholesterolemia  HYPERTENSION  Overactive bladder  Vitamin D deficiency  Sleep apnea, ink AFP  Office/Outpatient Visit Level 4 Established [id number] PC, DM (diabetes mellitus)  Hypercholesterolemia  HYPERTENSION  Overactive bladder  Vitamin D deficiency  Sleep apnea, INK AMB - AFP, 05/20/19 9:45:00 CDT  ?  5.?Vitamin D deficiency?E55.9  ?check vit D level  Ordered:  Clinic Follow up, *Est. 11/20/19 3:00:00 CST, Order for future visit, Wellness examination  DM (diabetes mellitus)  Hypercholesterolemia  HYPERTENSION  Overactive bladder  Vitamin D deficiency  Sleep apnea, [facility]  Office/Outpatient Visit Level 4 Established [id number] PC, DM (diabetes mellitus)  Hypercholesterolemia  HYPERTENSION  Overactive bladder  Vitamin D deficiency  Sleep apnea, INK AMB - [facility], 05/20/19 9:45:00 CDT  Vitamin D, 25-Hydroxy Level, Routine collect, 05/20/19 9:44:00 CDT, Blood, Order for future visit, Stop date 05/20/19 9:44:00 CDT, Lab Collect, Vitamin D deficiency, 05/20/19 9:44:00 CDT  ?  6.?Sleep apnea?G47.30  refer for sleep study--Hx sleep apnea in past, machine broke --refer for sleep study  Ordered:  Clinic Follow up, *Est. 11/20/19 3:00:00 CST, Order for future visit, Wellness examination  DM (diabetes mellitus)  Hypercholesterolemia  HYPERTENSION  Overactive bladder  Vitamin D deficiency  Sleep apnea, HLink  [facility]  Office/Outpatient Visit Level 4 Established [id number] PC, DM (diabetes mellitus)  Hypercholesterolemia  HYPERTENSION  Overactive bladder  Vitamin D deficiency  Sleep apnea, [facility] AMB - [facility], 05/20/19 9:45:00 CDT  Schedule Diagnostics Study, sleep study, dr [facility], 05/20/19 9:48:00 CDT, Sleep apnea  HYPERTENSION  ?  Orders:  solifenacin, 5 mg = 1 tab(s), Oral, Daily, # 90 tab(s), 3 Refill(s), Pharmacy: [id number]  --CMP, A1C, Vit D, FLP,  Referrals  Clinic Follow up, *Est. 11/20/19 3:00:00 CST, Order for future visit, Wellness examination  DM (diabetes mellitus)  Hypercholesterolemia  HYPERTENSION  Overactive bladder  Vitamin D deficiency  Sleep apnea, [facility]   Problem List/Past Medical History  Ongoing  Acute UTI  Depression  DM (diabetes mellitus)  GERD - Gastro-esophageal reflux disease  Hypercholesterolemia  HYPERTENSION  Knowledge deficit  Sleep apnea  Urinary incontinence  Vitamin D deficiency  Wellness examination  Historical  Allergic rhinitis  Obstructive sleep apnea of adult  Overactive bladder  Procedure/Surgical History  Colonoscopy (04/02/2019)  Colonoscopy (12.2013)  Tonsillectomy (1970)  Bilateral tubal ligation  Coronary angiogram   Medications  glipiZIDE 5 mg oral tablet, 5 mg= 1 tab(s), Oral, Daily  hydrochlorothiazide-lisinopril 25 mg-20 mg oral tablet, See Instructions  metFORMIN 1000 mg oral tablet, See Instructions, 1 refills  Metoprolol Tartrate 100 mg oral tablet, See Instructions, 1 refills  Misc Prescription, See Instructions, 11 refills  pravastatin 10 mg oral tablet, 10 mg= 1 tab(s), Oral, Once a day (at bedtime), 2 refills  VESIcare 5 mg oral tablet, 5 mg= 1 tab(s), Oral, Daily, 3 refills  Allergies  No Known Allergies  Social History  Alcohol - Low Risk, 12/02/2012  Current, 1-2 times per month, 03/06/2018  Employment/School  Work/School description: homemaker., 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  Atrial fibrillation: Mother.  Diabetes mellitus: Mother and Daughter.  Gastric bypass: Daughter.  Osteoarthritis: Son.  Pacemaker rhythm: Mother.  Immunizations  Vaccine Date Status   zoster vaccine live 06/2014 Recorded   pneumococcal 23-polyvalent vaccine 01/2013 Recorded   Health Maintenance  Health Maintenance  ???Pending?(in the next year)  ??? ??OverDue  ??? ? ? ?Pneumococcal Vaccine due??and every?  ??? ? ? ?Advance Directive due??01/01/19??and every 1??year(s)  ??? ? ? ?Alcohol Misuse Screening due??01/01/19??and every 1??year(s)  ??? ? ? ?Cognitive Screening due??01/01/19??and every 1??year(s)  ??? ? ? ?Fall Risk Assessment due??01/01/19??and every 1??year(s)  ??? ? ? ?Functional Assessment due??01/01/19??and every 1??year(s)  ??? ? ? ?Geriatric Depression Screening due??01/01/19??and every 1??year(s)  ??? ??Due?  ??? ? ? ?ADL Screening due??05/20/19??and every 1??year(s)  ??? ? ? ?Aspirin Therapy for CVD Prevention due??05/20/19??and every 1??year(s)  ??? ? ? ?Bone Density Screening due??05/20/19??Variable frequency  ??? ? ? ?Breast Cancer Screening (Senior Wellness) due??05/20/19??and every?  ??? ? ? ?Diabetes Maintenance-Eye Exam due??05/20/19??and every?  ??? ? ? ?Pneumococcal Vaccine due??05/20/19??Variable frequency  ??? ? ? ?Tetanus Vaccine due??05/20/19??and every 10??year(s)  ??? ??Due In Future?  ??? ? ? ?Diabetes Maintenance-Fasting Lipid Profile not due until??09/17/19??and every 1??year(s)  ??? ? ? ?Diabetes Maintenance-HgbA1c not due until??09/17/19??and every 1??year(s)  ??? ? ? ?Hypertension Management-BMP not due until??09/17/19??and every 1??year(s)  ??? ? ? ?Diabetes Maintenance-Microalbumin not due until??09/17/19??and every 1??year(s)  ??? ? ? ?Diabetes Maintenance-Serum Creatinine not due  until??09/17/19??and every 1??year(s)  ??? ? ? ?Diabetes Maintenance-Urine Dipstick not due until??09/17/19??and every 1??year(s)  ??? ? ? ?Diabetes Maintenance-Medication Prescribed not due until??12/20/19??and every 1??year(s)  ??? ? ? ?Obesity Screening not due until??01/01/20??and every 1??year(s)  ??? ? ? ?Diabetes Maintenance-Foot Exam not due until??05/19/20??and every 1??year(s)  ??? ? ? ?Hypertension Management-Blood Pressure not due until??05/19/20??and every 1??year(s)  ???Satisfied?(in the past 1 year)  ??? ??Satisfied?  ??? ? ? ?Blood Pressure Screening on??05/20/19.??Satisfied by Ryan Puga CMAri L.  ??? ? ? ?Body Mass Index Check on??05/20/19.??Satisfied by Ryan Puga CMAri L.  ??? ? ? ?Colorectal Screening on??04/02/19.??Satisfied by Suzy Durand  ??? ? ? ?Colorectal Screening (Senior Wellness) on??04/02/19.??Satisfied by Suzy Durand  ??? ? ? ?Diabetes Maintenance-Fasting Lipid Profile on??09/17/18.??Satisfied by Niranjan Rizzo  ??? ? ? ?Diabetes Maintenance-Foot Exam on??05/20/19.??Satisfied by Nabil Persaud MD  ??? ? ? ?Diabetes Maintenance-HgbA1c on??09/17/18.??Satisfied by Jose Payne  ??? ? ? ?Diabetes Maintenance-Medication Prescribed on??12/20/18.??Satisfied by Nabil Persaud MD  ??? ? ? ?Diabetes Maintenance-Microalbumin on??09/17/18.??Satisfied by Niranjan Rizzo  ??? ? ? ?Diabetes Maintenance-Serum Creatinine on??09/17/18.??Satisfied by Niranjan Rizzo  ??? ? ? ?Diabetes Maintenance-Urine Dipstick on??09/17/18.??Satisfied by Niranjan Rizzo  ??? ? ? ?Diabetes Screening on??09/17/18.??Satisfied by Jose Payne  ??? ? ? ?Hypertension Management-Blood Pressure on??05/20/19.??Satisfied by Marisela Puga CMA.  ??? ? ? ?Hypertension Management-BMP on??09/17/18.??Satisfied by Niranjan Rizzo  ??? ? ? ?Influenza Vaccine on??09/17/18.??Satisfied by Marisela Puga CMA  ??? ? ? ?Lipid Screening on??09/17/18.??Satisfied by Niranjan Rizzo  ??? ? ? ?Obesity Screening  on??05/20/19.??Satisfied by Marisela Puga CMA  ?  ?

## 2022-05-02 NOTE — HISTORICAL [facility] CERNER
This is a historical note converted from Celina. Formatting and pictures may have been removed.  Please reference Celina for original formatting and attached multimedia. Chief Complaint  Pt c/o right sided facial pressure/spasms and high blood pressure. ?Symptms started the same day she reseived steroid injection for her foot with Dr. Garcia. ?She is also on antibx for dental abcess.  History of Present Illness  6 month recheck DM ov and ER f/u HTN/HA from 9/10/2021.? Patient reports?she developed?30 seconds of?sharp right-sided?temple?headache pain?and her blood pressure increase. ?As result she went to the emergency room?where CT scan of the brain?was performed and returned negative.? Her blood pressure initially in the emergency room?was moderately elevated?but increased further to 200/88.? CBC and CMP were normal.? She was given a prescription of clonidine 0.2 mg 3 times daily?and instructed to follow-up in several days with her primary care doctor.  ?  Several weeks ago she called with complaints of?vaginal bleeding.? She did not seen a gynecologist in years?and as result she was referred to Dr. Sanchez?who performed?a Pap smear and?EMB?both of which returned negative.  ?  She continues to take Metformin 1000 mg twice daily for diabetes.? Her last A1c was well controlled?at 5.4.  ?  Today she reports that her headache is gone. ?She still gets occasional discomfort in the right temple area?but nothing persistent.? She did take a clonidine 0.2 mg this morning?but did not take any yesterday.? She reports that over the past several weeks her blood pressure has been ranging?1 40-1 50 systolic.? She is currently taking lisinopril?20 mg twice a day.  Review of Systems  GENERAL:?no? fatigue,  HEENT: DM eye exam completed 2/2021  RESP:?noSOB,? ?no?cough,?  CARDIAC:? ?no? chest pain,? ?no? palpations  GI:? ?no? N&V,? ?no? abd pain  NEURO:? ?no? headache,? CT neg in ER  Endo: no polyuria, polydipsia,  polyphagia  GYN:?Had single episode of postmenopausal bleeding?several weeks ago and was referred to?GYN, underwent Pap and?EMB which returned negative.  Physical Exam  Vitals & Measurements  HR:?66(Peripheral)? BP:?124/74?  HT:?158.70?cm? WT:?86.000?kg? BMI:?34.15?  GENERAL:? NAD  HEENT:? PERRLA, EOMI,? nares wnl,- Brudzinski  CHEST:?CTA ?bilaterally  CARDIAC:?RRR no murmur audible  NEURO:?CN II-XII wnl, gait wnl, no focal sensory/ motor deficits, diabetic foot exam today--see form  Assessment/Plan  1.?HYPERTENSION?I10  ?continue Lisinopril 20 mg bid , begin Coreg?6.25 ??bid, use clonidine 0.2 bid prn BP > 160/95--keep BP record and bring to f/u in 2 weeks.  Ordered:  Clinic Follow up, *Est. 08/25/21 9:45:00 CDT, Order for future visit, HYPERTENSION, [facility]Link AFP  Office/Outpatient Visit Level 4 Established [id number] PC, HYPERTENSION  HA (headache)  Diabetes mellitus  Vaginal bleeding  Hypercholesterolemia  Vitamin D deficiency  OAB (overactive bladder), [facility] AMB - [facility], 08/11/21 11:31:00 CDT  ?  2.?HA (headache)?R51.9  ?CT neg in ER 9/9/2021  Ordered:  Office/Outpatient Visit Level 4 Established [id number] PC, HYPERTENSION  HA (headache)  Diabetes mellitus  Vaginal bleeding  Hypercholesterolemia  Vitamin D deficiency  OAB (overactive bladder), [facility] AMB - AFP, 08/11/21 11:31:00 CDT  ?  3.?Diabetes mellitus?E11.9  Last A1C 5.4, continue metformin 1000 bid, on ACE, DM eye exam 2/2021, DM foot exam today  Ordered:  Hemoglobin A1c, Routine collect, 08/11/21 11:37:00 CDT, Blood, Stop date 08/11/21 11:37:00 CDT, Lab Collect, Diabetes mellitus, 08/11/21 11:37:00 CDT  Office/Outpatient Visit Level 4 Established [id number] PC, HYPERTENSION  HA (headache)  Diabetes mellitus  Vaginal bleeding  Hypercholesterolemia  Vitamin D deficiency  OAB (overactive bladder), HLINK AMB - AFP, 08/11/21 11:31:00 CDT  ?  4.?Vaginal bleeding?N93.9  ?referred to Ulysses marquez--PAP and endometrial bx neg  Ordered:  Office/Outpatient Visit  Level 4 Established [id number] PC, HYPERTENSION  HA (headache)  Diabetes mellitus  Vaginal bleeding  Hypercholesterolemia  Vitamin D deficiency  OAB (overactive bladder), INK AMB - AFP, 08/11/21 11:31:00 CDT  ?  5.?Hypercholesterolemia?E78.00  Statin intolerant--started last visit-continue zetia 10 mg--Encourage low-cholesterol diet, increase cardiovascular exercise and weight loss  Ordered:  Lipid Panel, Routine collect, 08/11/21 11:37:00 CDT, Blood, Stop date 08/11/21 11:37:00 CDT, Lab Collect, Hypercholesterolemia, 08/11/21 11:37:00 CDT  Office/Outpatient Visit Level 4 Established [id number] PC, HYPERTENSION  HA (headache)  Diabetes mellitus  Vaginal bleeding  Hypercholesterolemia  Vitamin D deficiency  OAB (overactive bladder), INK AMB - AFP, 08/11/21 11:31:00 CDT  ?  6.?Vitamin D deficiency?E55.9  ?check vit D level  Ordered:  Office/Outpatient Visit Level 4 Established [id number] PC, HYPERTENSION  HA (headache)  Diabetes mellitus  Vaginal bleeding  Hypercholesterolemia  Vitamin D deficiency  OAB (overactive bladder), INK AMB - AFP, 08/11/21 11:31:00 CDT  Vitamin D, 25-Hydroxy Level, Routine collect, 08/11/21 11:37:00 CDT, Blood, Stop date 08/11/21 11:37:00 CDT, Lab Collect, Vitamin D deficiency, 08/11/21 11:37:00 CDT  ?  7.?OAB (overactive bladder)?N32.81  continue vesicare 10 mg  Ordered:  Office/Outpatient Visit Level 4 Established [id number] PC, HYPERTENSION  HA (headache)  Diabetes mellitus  Vaginal bleeding  Hypercholesterolemia  Vitamin D deficiency  OAB (overactive bladder), INK AMB - AFP, 08/11/21 11:31:00 CDT  ?  Orders:  carvedilol, 6.25 mg = 1 tab(s), Oral, BID, # 60 tab(s), 6 Refill(s), Pharmacy: [id number], 158.7, cm, Height/Length Dosing, 08/11/21 11:08:00 CDT, 86, kg, Weight Dosing, 08/11/21 11:08:00 CDT  Clinic Follow up, *Est. 02/11/22 3:00:00 CST, Order for future visit, Wellness examination, HLink AFP  4x--check A1c, FLP, today, vit D-----CPX after 2/10/2021,  recheck 2 weeks  Referrals  Clinic Follow up, *Est. 02/11/22 3:00:00 CST, Order for future visit, Wellness examination, HLink AFP  Clinic Follow up, *Est. 08/25/21 9:45:00 CDT, Order for future visit, HYPERTENSION, [facility]Link AFP   Problem List/Past Medical History  Ongoing  Acute UTI  Depression  DM (diabetes mellitus)  GERD - Gastro-esophageal reflux disease  Hypercholesterolemia  HYPERTENSION  Knowledge deficit  Left foot pain  Podagra  Sleep apnea  Urinary incontinence  Vitamin D deficiency  Wellness examination  Historical  Allergic rhinitis  Obstructive sleep apnea of adult  Overactive bladder  Statin not tolerated  Procedure/Surgical History  Colonoscopy (04/02/2019)  Colonoscopy (12.2013)  Tonsillectomy (1970)  Bilateral tubal ligation  Coronary angiogram   Medications  acetaminophen-hydrocodone 325 mg-7.5 mg oral tablet, 1 tab(s), Oral, QID  amoxicillin 500 mg oral tablet, 500 mg= 1 tab(s), Oral, TID  carvedilol 6.25 mg oral tablet, 6.25 mg= 1 tab(s), Oral, BID  cloniDINE 0.2 mg oral tablet, 0.2 mg= 1 tab(s), Oral, TID  Coreg 6.25 mg oral tablet, 6.25 mg= 1 tab(s), Oral, BID, 6 refills  Flonase 50 mcg/inh nasal spray, 1 spray(s), Nasal, BID, 11 refills  lisinopril 20 mg oral tablet, See Instructions  metFORMIN 1000 mg oral tablet, See Instructions  Misc Prescription, See Instructions, 11 refills  Misc Prescription, See Instructions  modafinil 200 mg oral tablet, 100 mg, Oral, qAM  solifenacin 10 mg oral tablet, 10 mg= 1 tab(s), Oral, Daily, 11 refills  TRUE METRIX B/G TEST STRIPS 50S, See Instructions  True Metrix Lancets, See Instructions, 11 refills  True Metrix test strip, See Instructions  Vitamin D3 2000 intl units oral tablet, 2000 IntUnit= 1 tab(s), Oral, Daily  Zetia 10 mg oral tablet, 10 mg= 1 tab(s), Oral, Daily, 3 refills  Zinc, 140 mg, Oral, Daily  Zyrtec 10 mg oral tablet, 10 mg= 1 tab(s), Oral, Daily  Allergies  No Known Allergies  Social History  Abuse/Neglect  No, 08/11/2021  No, 06/29/2021  No,  05/20/2021  No, 02/10/2021  No, 08/04/2020  No, 02/04/2020  No, 06/18/2019  Alcohol - Low Risk, 12/02/2012  Current, 1-2 times per month, 03/06/2018  Employment/School  Work/School description: homemaker., 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  Home/Environment  Lives with Spouse., 01/07/2018  Substance Use - Denies Substance Abuse, 12/02/2012  Tobacco - Denies Tobacco Use, 12/02/2012  Never (less than 100 in lifetime), No, 08/11/2021  Never (less than 100 in lifetime), No, 06/29/2021  Never (less than 100 in lifetime), No, 05/20/2021  Never (less than 100 in lifetime), N/A, 02/10/2021  Never (less than 100 in lifetime), N/A, 08/04/2020  Never (less than 100 in lifetime), N/A, 02/04/2020  Never (less than 100 in lifetime), N/A, 06/18/2019  Never (less than 100 in lifetime), N/A, 05/20/2019  Never smoker, 01/07/2018  Family History  Atrial fibrillation: Mother.  Diabetes mellitus: Mother and Daughter.  Gastric bypass: Daughter.  Pacemaker rhythm: Mother.  Immunizations  Vaccine Date Status   pneumococcal 13-valent conjugate vaccine 08/04/2020 Given   zoster vaccine live 06/2014 Recorded   pneumococcal 23-polyvalent vaccine 01/2013 Recorded   Health Maintenance  Health Maintenance  ???Pending?(in the next year)  ??? ??OverDue  ??? ? ? ?Advance Directive due??01/02/21??and every 1??year(s)  ??? ? ? ?Alcohol Misuse Screening due??01/02/21??and every 1??year(s)  ??? ? ? ?Cognitive Screening due??01/02/21??and every 1??year(s)  ??? ? ? ?Fall Risk Assessment due??01/02/21??and every 1??year(s)  ??? ? ? ?Functional Assessment due??01/02/21??and every 1??year(s)  ??? ??Due?  ??? ? ? ?Aspirin Therapy for CVD Prevention due??08/04/21??and every 1??year(s)  ??? ? ? ?ADL Screening due??08/11/21??and every 1??year(s)  ??? ? ? ?Pneumococcal Vaccine due??08/11/21??Unknown Frequency  ??? ? ? ?Tetanus Vaccine due??08/11/21??and every 10??year(s)  ??? ? ? ?Zoster Vaccine due??08/11/21??Unknown Frequency  ??? ??Due In Future?  ??? ? ? ?Diabetes  Maintenance-Medication Prescribed not due until??10/23/21??and every 1??year(s)  ??? ? ? ?Obesity Screening not due until??01/01/22??and every 1??year(s)  ??? ? ? ?Depression Screening not due until??02/10/22??and every 1??year(s)  ??? ? ? ?Diabetes Maintenance-HgbA1c not due until??02/10/22??and every 1??year(s)  ??? ? ? ?Medicare Annual Wellness Exam not due until??02/10/22??and every 1??year(s)  ??? ? ? ?Diabetes Maintenance-Fasting Lipid Profile not due until??02/10/22??and every 1??year(s)  ??? ? ? ?Diabetes Maintenance-Eye Exam not due until??02/11/22??and every 1??year(s)  ??? ? ? ?Hypertension Management-BMP not due until??08/10/22??and every 1??year(s)  ??? ? ? ?Diabetes Maintenance-Serum Creatinine not due until??08/10/22??and every 1??year(s)  ???Satisfied?(in the past 1 year)  ??? ??Satisfied?  ??? ? ? ?Blood Pressure Screening on??08/11/21.??Satisfied by Marisela Puga CMA  ??? ? ? ?Body Mass Index Check on??08/11/21.??Satisfied by Marisela Puga CMA  ??? ? ? ?Bone Density Screening on??10/23/20.??Satisfied by Suzy Durand  ??? ? ? ?Breast Cancer Screening on??10/23/20.??Satisfied by Suzy Durand  ??? ? ? ?Cervical Cancer Screening on??07/22/21.??Satisfied by Belinda Fregoso  ??? ? ? ?Depression Screening on??02/10/21.??Satisfied by Gilbert Persaud MD  ??? ? ? ?Diabetes Maintenance-Eye Exam on??02/11/21.??Satisfied by Suzy Durand  ??? ? ? ?Diabetes Maintenance-Fasting Lipid Profile on??02/10/21.??Satisfied by Niranjan Rizzo  ??? ? ? ?Diabetes Maintenance-Foot Exam on??08/11/21.??Satisfied by Gilbert Persaud MD  ??? ? ? ?Diabetes Maintenance-HgbA1c on??02/10/21.??Satisfied by Nicole Sanchez  ??? ? ? ?Diabetes Maintenance-Medication Prescribed on??10/23/20.??Satisfied by Gilbert Persaud MD  ??? ? ? ?Diabetes Maintenance-Serum Creatinine on??08/10/21.??Satisfied by Renee Roberts  ??? ? ? ?Diabetes Screening on??08/10/21.??Satisfied by Renee Roberts  ??? ? ? ?Hypertension  Management-Blood Pressure on??08/11/21.??Satisfied by Marisela Puga CMA  ??? ? ? ?Hypertension Management-BMP on??08/10/21.??Satisfied by Renee Roberts  ??? ? ? ?Influenza Vaccine on??02/10/21.??Satisfied by Marisela Puga CMA  ??? ? ? ?Lipid Screening on??02/10/21.??Satisfied by Niranjan Rizzo  ??? ? ? ?Medicare Annual Wellness Exam on??02/10/21.??Satisfied by Gilbert Persaud MD  ??? ? ? ?Obesity Screening on??08/11/21.??Satisfied by Marisela Puga CMA  ?

## 2022-05-02 NOTE — HISTORICAL [facility] CERNER
This is a historical note converted from Celina. Formatting and pictures may have been removed.  Please reference Celina for original formatting and attached multimedia. Chief Complaint  ANNUAL MEDICARE WELLNESS/FASTING  History of Present Illness  Patient is here today for wellness CPX.? She is tolerating all medications without side effects. ?She does not feel like the generic?Vesicare?is working like the name brand did in the past.? She has kept her weight off.? She did have a period of palpitations and called the ambulance, they hooked her to monitor?and her heart rate and rhythm was normal.? She thought it may be anxiety.? Her blood pressure is low today?and she has been having periodic?dizziness?and as result we will discontinue her?HCTZ component?of her combination blood pressure medication.? This may help her urinary symptoms as well as?if her dizziness is due to?hypotension.? She will also get a blood pressure monitor to?check regularly.? She has a diabetic eye exam scheduled tomorrow.  Review of Systems  GENERAL:??no?unexplained wtloss, no fever, mild fatigue, chills, night sweats or weakness  HEENT: no? sore throat, ear pain, sinus pressure, nasal congestion, or rhinorrhea, +AR  VISION:?no vision changes, glaucoma, cataracts DM eye exam tomorrow  CARDIAC: no?chest pain,?palpitations,?Dyspnea on exertion,?orthopnea--neg angiogram years ago (neg )  RESPIRATORY:?no?cough,?wheezing, sputum production,or?SOB--getting COVID vaccine on Wednesday  GI: no??abdominal pain,?n&v, constipation,?diarrhea,??blood in stool or_family history of colon cancer_  :?no? dysuria, hematuria, frequency, ?+ urgency, incontinence,? vaginal discharge,? abn. vaginal bleeding  MUSC/SKEL:? no? myalgia, weakness, edema,? arthralgia, or?joint effusion  SKIN:? No?rash, hives, ?itching or ?sores  NEURO:? No headaches, numbness, ?tingling, weakness, or? occasional dizziness  PSYCH:??mild ?anxiety, no ?depression, ?irritability, ?suicidal  ideation or?hallucinations  ENDO:? No polyuria, polydipsia, ?polyphagia  HEME:? No?Bruising, ?lymphadenopathy, bleeding disorders ?or?signs of anemia  Physical Exam  Vitals & Measurements  T:?36.9? ?C (Oral)? HR:?60(Peripheral)? BP:?92/54?  HT:?158.70?cm? WT:?84.500?kg? BMI:?33.55?  GENERAL: NAD, alert and oriented x 3  SKIN:? no rash or abnormal appearing skin lesions  HEENT:? PERRLA, EOMI,EAC and TM wnl bilaterally  NECK:? FROM, no lymphadenopathy, no thyroid abnormalities palpable  CHEST:? CTA bilaterally no wheezes, crackles or rubs  CARDIAC:? RRR, no murmurs audible  ABDOMEN:? Soft, nontender, nondistended, NBSx4,?no rebound or guarding, no HSM  EXTREMITIES:? no clubbing, cyanosis, or edema.? joints wnl. +2 DP/PT pulse bilaterally  NEURO:? no sensory or motor deficits noted. CN II-XII intact. Gait wnl.?  GENITAL: defer to gyn no longer sees--set up [facility]  Assessment/Plan  1.?Wellness examination?Z00.00  ?CBC, CMP, FLP, TSH, U/A, A1C, vit d, GYN no longer sees, set up MM[facility] ABC after 10/23/2021, Dexa good 10/2020, colonoscopy 2019 due 2024, Encourage pt to increase cardiovascular exercise and attempt to obtain at least 150 minutes of moderate aerobic exercise per week or 75 minutes of vigorous aerobic exercise weekly.? Recommend at least 2 days of weight bearing exercise to help increase bone density.  Ordered:  Lab Collection Request, 02/10/21 10:12:00 CST, [facility], 02/10/21 10:12:00 CST, Wellness examination  DM (diabetes mellitus)  HYPERTENSION  Hypercholesterolemia  Vitamin D deficiency  OAB (overactive bladder)  Medicare Annual Wellness- Subsequent  PC, Wellness examination  DM (diabetes mellitus)  HYPERTENSION  Hypercholesterolemia  Vitamin D deficiency  OAB (overactive bladder), [facility], 02/10/21 10:13:00 CST  Schedule Diagnostics Study, screening mmg, [facility]-due in Oct 2021, 02/10/21 10:13:00 CST, Wellness examination  ?  2.?Encounter for  immunization?Z23  ?pneumovax booster decline ,?getting ?covid vaccine wednesday  ?  3.?DM (diabetes mellitus)?E11.9  ?last A1C 5.6--continue metformin 1000 bid, on ACE, DM eye exam due, DM foot exam 8/2020  Ordered:  CBC w/ Auto Diff, Routine collect, 02/10/21 10:12:00 CST, Blood, Order for future visit, Stop date 02/10/21 10:12:00 CST, Lab Collect, DM (diabetes mellitus)  HYPERTENSION, 02/10/21 10:12:00 CST  Clinic Follow up, *Est. 08/10/21 3:00:00 CDT, Order for future visit, DM (diabetes mellitus)  HYPERTENSION  Hypercholesterolemia  Vitamin D deficiency  OAB (overactive bladder), ink [facility]  Comprehensive Metabolic Panel, Routine collect, 02/10/21 10:13:00 CST, Blood, Order for future visit, Stop date 02/10/21 10:13:00 CST, Lab Collect, DM (diabetes mellitus)  HYPERTENSION  Hypercholesterolemia, 02/10/21 10:13:00 CST  Hemoglobin A1c, Routine collect, 02/10/21 10:13:00 CST, Blood, Order for future visit, Stop date 02/10/21 10:13:00 CST, Lab Collect, DM (diabetes mellitus), 02/10/21 10:13:00 CST  Lab Collection Request, 02/10/21 10:12:00 CST, [facility]K AMB - [facility], 02/10/21 10:12:00 CST, Wellness examination  DM (diabetes mellitus)  HYPERTENSION  Hypercholesterolemia  Vitamin D deficiency  OAB (overactive bladder)  Medicare Annual Wellness- Subsequent  PC, Wellness examination  DM (diabetes mellitus)  HYPERTENSION  Hypercholesterolemia  Vitamin D deficiency  OAB (overactive bladder), [facility] AMB - [facility], 02/10/21 10:13:00 CST  Thyroid Stimulating Hormone, Routine collect, 02/10/21 10:13:00 CST, Blood, Order for future visit, Stop date 02/10/21 10:13:00 CST, Lab Collect, DM (diabetes mellitus)  HYPERTENSION  Hypercholesterolemia, 02/10/21 10:13:00 CST  Urinalysis no Reflex, Routine collect, Urine, Order for future visit, 02/10/21 10:13:00 CST, Stop date 02/10/21 10:13:00 CST, Nurse collect, HYPERTENSION  DM (diabetes mellitus)  ?  4.?HYPERTENSION?I10  ?continue Lisinopril hct 20/25--change to  lisinopril 20 mg  Ordered:  CBC w/ Auto Diff, Routine collect, 02/10/21 10:12:00 CST, Blood, Order for future visit, Stop date 02/10/21 10:12:00 CST, Lab Collect, DM (diabetes mellitus)  HYPERTENSION, 02/10/21 10:12:00 CST  Clinic Follow up, *Est. 08/10/21 3:00:00 CDT, Order for future visit, DM (diabetes mellitus)  HYPERTENSION  Hypercholesterolemia  Vitamin D deficiency  OAB (overactive bladder), ink [facility]  Comprehensive Metabolic Panel, Routine collect, 02/10/21 10:13:00 CST, Blood, Order for future visit, Stop date 02/10/21 10:13:00 CST, Lab Collect, DM (diabetes mellitus)  HYPERTENSION  Hypercholesterolemia, 02/10/21 10:13:00 CST  Lab Collection Request, 02/10/21 10:12:00 CST, [facility] - [facility], 02/10/21 10:12:00 CST, Wellness examination  DM (diabetes mellitus)  HYPERTENSION  Hypercholesterolemia  Vitamin D deficiency  OAB (overactive bladder)  Medicare Annual Wellness- Subsequent  PC, Wellness examination  DM (diabetes mellitus)  HYPERTENSION  Hypercholesterolemia  Vitamin D deficiency  OAB (overactive bladder), [facility] - [facility], 02/10/21 10:13:00 CST  Thyroid Stimulating Hormone, Routine collect, 02/10/21 10:13:00 CST, Blood, Order for future visit, Stop date 02/10/21 10:13:00 CST, Lab Collect, DM (diabetes mellitus)  HYPERTENSION  Hypercholesterolemia, 02/10/21 10:13:00 CST  Urinalysis no Reflex, Routine collect, Urine, Order for future visit, 02/10/21 10:13:00 CST, Stop date 02/10/21 10:13:00 CST, Nurse collect, HYPERTENSION  DM (diabetes mellitus)  ?  5.?Hypercholesterolemia?E78.00  ?continue pravastatin 10 mg  Ordered:  Clinic Follow up, *Est. 08/10/21 3:00:00 CDT, Order for future visit, DM (diabetes mellitus)  HYPERTENSION  Hypercholesterolemia  Vitamin D deficiency  OAB (overactive bladder), ink [facility]  Comprehensive Metabolic Panel, Routine collect, 02/10/21 10:13:00 CST, Blood, Order for future visit, Stop date 02/10/21 10:13:00 CST, Lab Collect, DM (diabetes mellitus)   HYPERTENSION  Hypercholesterolemia, 02/10/21 10:13:00 CST  Lab Collection Request, 02/10/21 10:12:00 CST, [facility] - [facility], 02/10/21 10:12:00 CST, Wellness examination  DM (diabetes mellitus)  HYPERTENSION  Hypercholesterolemia  Vitamin D deficiency  OAB (overactive bladder)  Lipid Panel, Routine collect, 02/10/21 10:13:00 CST, Blood, Order for future visit, Stop date 02/10/21 10:13:00 CST, Lab Collect, Hypercholesterolemia, 02/10/21 10:13:00 CST  Medicare Annual Wellness- Subsequent  PC, Wellness examination  DM (diabetes mellitus)  HYPERTENSION  Hypercholesterolemia  Vitamin D deficiency  OAB (overactive bladder), [facility], 02/10/21 10:13:00 CST  Thyroid Stimulating Hormone, Routine collect, 02/10/21 10:13:00 CST, Blood, Order for future visit, Stop date 02/10/21 10:13:00 CST, Lab Collect, DM (diabetes mellitus)  HYPERTENSION  Hypercholesterolemia, 02/10/21 10:13:00 CST  ?  6.?Vitamin D deficiency?E55.9  ?check vit D, continue supplementation  Ordered:  Clinic Follow up, *Est. 08/10/21 3:00:00 CDT, Order for future visit, DM (diabetes mellitus)  HYPERTENSION  Hypercholesterolemia  Vitamin D deficiency  OAB (overactive bladder), [facility]  Lab Collection Request, 02/10/21 10:12:00 CST, [facility], 02/10/21 10:12:00 CST, Wellness examination  DM (diabetes mellitus)  HYPERTENSION  Hypercholesterolemia  Vitamin D deficiency  OAB (overactive bladder)  Medicare Annual Wellness- Subsequent  PC, Wellness examination  DM (diabetes mellitus)  HYPERTENSION  Hypercholesterolemia  Vitamin D deficiency  OAB (overactive bladder), [facility], 02/10/21 10:13:00 CST  Vitamin D, 25-Hydroxy Level, Routine collect, 02/10/21 10:13:00 CST, Blood, Order for future visit, Stop date 02/10/21 10:13:00 CST, Lab Collect, Vitamin D deficiency, 02/10/21 10:13:00 CST  ?  7.?OAB (overactive bladder)?N32.81  ?continue Vesicare  Ordered:  Clinic Follow up, *Est. 08/10/21 3:00:00 CDT, Order for  future visit, DM (diabetes mellitus)  HYPERTENSION  Hypercholesterolemia  Vitamin D deficiency  OAB (overactive bladder), ink [facility]  Lab Collection Request, 02/10/21 10:12:00 CST, [facility] - [facility], 02/10/21 10:12:00 CST, Wellness examination  DM (diabetes mellitus)  HYPERTENSION  Hypercholesterolemia  Vitamin D deficiency  OAB (overactive bladder)  Medicare Annual Wellness- Subsequent  PC, Wellness examination  DM (diabetes mellitus)  HYPERTENSION  Hypercholesterolemia  Vitamin D deficiency  OAB (overactive bladder), INK AMB - [facility], 02/10/21 10:13:00 CST  ?  Orders:  lisinopril, 20 mg = 1 tab(s), Oral, Daily, # 90 tab(s), 3 Refill(s), Pharmacy: [facility] #[id number], 158.7, cm, Height/Length Dosing, 02/10/21 9:54:00 CST, 84.5, kg, Weight Dosing, 02/10/21 9:54:00 CST  Referrals  Clinic Follow up, *Est. 08/10/21 3:00:00 CDT, Order for future visit, DM (diabetes mellitus)  HYPERTENSION  Hypercholesterolemia  Vitamin D deficiency  OAB (overactive bladder), ink [facility]   Problem List/Past Medical History  Ongoing  Acute UTI  Depression  DM (diabetes mellitus)  GERD - Gastro-esophageal reflux disease  Hypercholesterolemia  HYPERTENSION  Knowledge deficit  Left foot pain  Podagra  Sleep apnea  Urinary incontinence  Vitamin D deficiency  Wellness examination  Historical  Allergic rhinitis  Obstructive sleep apnea of adult  Overactive bladder  Procedure/Surgical History  Colonoscopy (04/02/2019)  Colonoscopy (12.2013)  Tonsillectomy (1970)  Bilateral tubal ligation  Coronary angiogram   Medications  Flonase 50 mcg/inh nasal spray, 1 spray(s), Nasal, BID, 11 refills  lisinopril 20 mg oral tablet, 20 mg= 1 tab(s), Oral, Daily, 3 refills  metFORMIN 1000 mg oral tablet, See Instructions  Misc Prescription, See Instructions  Misc Prescription, See Instructions, 11 refills  modafinil 200 mg oral tablet, 100 mg, Oral, qAM  pravastatin 10 mg oral tablet, 10 mg= 1 tab(s), Oral, Once a day (at bedtime), 2  refills  solifenacin 10 mg oral tablet, 10 mg= 1 tab(s), Oral, Daily  TRUE METRIX BLOOD GLUCOSE TEST STRP, See Instructions, 5 refills  True Metrix Lancets, See Instructions, 11 refills  Vitamin D3 2000 intl units oral tablet, 2000 IntUnit= 1 tab(s), Oral, Daily  Zinc, 140 mg, Oral, Daily  Zyrtec 10 mg oral tablet, 10 mg= 1 tab(s), Oral, Daily  Allergies  No Known Allergies  Social History  Abuse/Neglect  No, 02/10/2021  No, 08/04/2020  No, 02/04/2020  No, 06/18/2019  Alcohol - Low Risk, 12/02/2012  Current, 1-2 times per month, 03/06/2018  Employment/School  Work/School description: homemaker., 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  Diabetes mellitus: Mother and Daughter.  Gastric bypass: Daughter.  Pacemaker rhythm: Mother.  Immunizations  Vaccine Date Status   pneumococcal 13-valent conjugate vaccine 08/04/2020 Given   zoster vaccine live 06/2014 Recorded   pneumococcal 23-polyvalent vaccine 01/2013 Recorded   Health Maintenance  Health Maintenance  ???Pending?(in the next year)  ??? ??OverDue  ??? ? ? ?Influenza Vaccine due??10/01/20??and every 1??day(s)  ??? ? ? ?Advance Directive due??01/02/21??and every 1??year(s)  ??? ? ? ?Cognitive Screening due??01/02/21??and every 1??year(s)  ??? ? ? ?Fall Risk Assessment due??01/02/21??and every 1??year(s)  ??? ? ? ?Functional Assessment due??01/02/21??and every 1??year(s)  ??? ??Due?  ??? ? ? ?Alcohol Misuse Screening due??01/02/21??and every 1??year(s)  ??? ? ? ?ADL Screening due??02/10/21??and every 1??year(s)  ??? ? ?  ?Depression Screening due??02/10/21??Unknown Frequency  ??? ? ? ?Diabetes Maintenance-Eye Exam due??02/10/21??Unknown Frequency  ??? ? ? ?Tetanus Vaccine due??02/10/21??and every 10??year(s)  ??? ? ? ?Zoster Vaccine due??02/10/21??Unknown Frequency  ??? ??Due In Future?  ??? ? ? ?Blood Pressure Screening not due until??08/04/21??and every 1??year(s)  ??? ? ? ?Body Mass Index Check not due until??08/04/21??and every 1??year(s)  ??? ? ? ?Diabetes Maintenance-Foot Exam not due until??08/04/21??and every 1??year(s)  ??? ? ? ?Diabetes Maintenance-HgbA1c not due until??08/04/21??and every 1??year(s)  ??? ? ? ?Hypertension Management-BMP not due until??08/04/21??and every 1??year(s)  ??? ? ? ?Hypertension Management-Blood Pressure not due until??08/04/21??and every 1??year(s)  ??? ? ? ?Diabetes Maintenance-Fasting Lipid Profile not due until??08/04/21??and every 1??year(s)  ??? ? ? ?Diabetes Maintenance-Serum Creatinine not due until??08/04/21??and every 1??year(s)  ??? ? ? ?Aspirin Therapy for CVD Prevention not due until??08/04/21??and every 1??year(s)  ??? ? ? ?Diabetes Maintenance-Medication Prescribed not due until??10/23/21??and every 1??year(s)  ??? ? ? ?Obesity Screening not due until??01/01/22??and every 1??year(s)  ???Satisfied?(in the past 1 year)  ??? ??Satisfied?  ??? ? ? ?Aspirin Therapy for CVD Prevention on??08/04/20.??Satisfied by Gilbert Persaud MD  ??? ? ? ?Blood Pressure Screening on??02/10/21.??Satisfied by Marisela uPga CMA  ??? ? ? ?Body Mass Index Check on??02/10/21.??Satisfied by Marisela Puga CMA  ??? ? ? ?Bone Density Screening on??10/23/20.??Satisfied by Suzy Durand  ??? ? ? ?Breast Cancer Screening on??10/23/20.??Satisfied by Suzy Durand  ??? ? ? ?Depression Screening on??02/10/21.??Satisfied by Gilbert Persaud MD  ??? ? ? ?Diabetes Maintenance-Fasting Lipid Profile on??08/04/20.??Satisfied by Niranjan Rizzo  ??? ? ? ?Diabetes Maintenance-Foot Exam on??08/04/20.??Satisfied  by Gilbert Persaud MD  ??? ? ? ?Diabetes Maintenance-HgbA1c on??08/04/20.??Satisfied by Arlin Puga  ??? ? ? ?Diabetes Maintenance-Medication Prescribed on??10/23/20.??Satisfied by Glibert ePrsaud MD  ??? ? ? ?Diabetes Maintenance-Serum Creatinine on??08/04/20.??Satisfied by Niranjan Rizzo  ??? ? ? ?Diabetes Screening on??08/04/20.??Satisfied by Niranjan Rizzo  ??? ? ? ?Hypertension Management-BMP on??08/04/20.??Satisfied by Niranjan Rizzo  ??? ? ? ?Hypertension Management-Blood Pressure on??02/10/21.??Satisfied by Marisela Puga CMA  ??? ? ? ?Influenza Vaccine on??02/10/21.??Satisfied by Marisela Puga CMA  ??? ? ? ?Lipid Screening on??08/04/20.??Satisfied by Niranjan Rizzo  ??? ? ? ?Medicare Annual Wellness Exam on??02/10/21.??Satisfied by Gilbert Persaud MD  ??? ? ? ?Obesity Screening on??02/10/21.??Satisfied by Marisela Puga CMA  ??? ? ? ?Pneumococcal Vaccine on??08/04/20.??Satisfied by Marisela Puga CMA  ?

## 2022-05-02 NOTE — HISTORICAL [facility] CERNER
This is a historical note converted from [facility]. Formatting and pictures may have been removed.  Please reference Celina for original formatting and attached multimedia. Chief Complaint  6M[facility] RC/FASTING. PT REQ ANTIBODY TESTING.  History of Present Illness  Patient is here today for 6-month recheck diabetes/HTN/hypercholesterolemia and low vitamin D. ?She is tolerating all medications without side effects.? Her last A1c was 5.8.? She did have a diabetic eye exam in February.? She has lost about 10 pounds over the past 6 months?due to dietary changes alone. ?He is not exercising regularly but will try to improve.? She has been utilizing safety precautions with regard to?COVID-19 crisis.? She does report having increased?memory loss?that is been gradual in onset.? She tried taking generic?Vesicare?but did not find it worked as well and would like to have the name brand?prescribed again  Review of Systems  General:???+ ?weightloss? 13lb ?_  HEENT:???novision changes,? dm eye exam?less than 1 yr ago?2/2020  CARDIAC:?no?chest pain, SOB,  GI:?no?diarrhea,?no?n&v  ENDOCRINE:?nopolyuria,no?polydipsia,?no?polyphagia  EXT:?no?signs of neuropathy  Physical Exam  Vitals & Measurements  T:?37.0? ?C (Oral)? HR:?72(Peripheral)? BP:?118/70?  HT:?158.70?cm? WT:?84.600?kg? BMI:?33.59?  GENERAL:?? alert and oriented x4  HEENT:? PERRLA, mouth and throat clear, mucous membranes moist  CHEST:? CTA bilaterally  CARDIAC:? RRR no murmurs audible  EXT:?no? ?edema to lower extremities? ?bilaterally,?diabetic foot exam today, see form  Assessment/Plan  1.?DM (diabetes mellitus)?E11.9  Last A1C 5.9, continue metformin 1000 bid? on ACE, Dm?eye exam 2/6/2020, Dm foot exam today  Ordered:  Clinic Follow up, *Est. 02/04/21 3:00:00 CST, Order for future visit, Wellness examination  DM (diabetes mellitus)  HYPERTENSION, [facility] AFP  Comprehensive Metabolic Panel, Routine collect, 08/04/20 10:25:00 CDT, Blood, Order for future visit, Stop date  08/04/20 10:25:00 CDT, Lab Collect, DM (diabetes mellitus)  HYPERTENSION  Hypercholesterolemia, 08/04/20 10:25:00 CDT  Hemoglobin A1c, Routine collect, 08/04/20 10:25:00 CDT, Blood, Order for future visit, Stop date 08/04/20 10:25:00 CDT, Lab Collect, DM (diabetes mellitus), 08/04/20 10:25:00 CDT  Lab Collection Request, 08/04/20 10:25:00 CDT, [facility] AMB - AFP, 08/04/20 10:25:00 CDT, DM (diabetes mellitus)  HYPERTENSION  Hypercholesterolemia  Vitamin D deficiency  Office/Outpatient Visit Level 4 Established [id number] PC, DM (diabetes mellitus)  HYPERTENSION  Hypercholesterolemia  Vitamin D deficiency, [facility] AMB - AFP, 08/04/20 10:26:00 CDT  ?  2.?HYPERTENSION?I10  continue Lisinopril HCT 20/25  Ordered:  Clinic Follow up, *Est. 02/04/21 3:00:00 CST, Order for future visit, Wellness examination  DM (diabetes mellitus)  HYPERTENSION, [facility] AFP  Comprehensive Metabolic Panel, Routine collect, 08/04/20 10:25:00 CDT, Blood, Order for future visit, Stop date 08/04/20 10:25:00 CDT, Lab Collect, DM (diabetes mellitus)  HYPERTENSION  Hypercholesterolemia, 08/04/20 10:25:00 CDT  Lab Collection Request, 08/04/20 10:25:00 CDT, [facility] AMB - AFP, 08/04/20 10:25:00 CDT, DM (diabetes mellitus)  HYPERTENSION  Hypercholesterolemia  Vitamin D deficiency  Office/Outpatient Visit Level 4 Established [id number] PC, DM (diabetes mellitus)  HYPERTENSION  Hypercholesterolemia  Vitamin D deficiency, [facility] AM[facility] - AFP, 08/04/20 10:26:00 CDT  ?  3.?Hypercholesterolemia?E78.00  ?check FLP, continue pravastatin 10 mg--consider increase to 20 mg?pending value  Ordered:  Comprehensive Metabolic Panel, Routine collect, 08/04/20 10:25:00 CDT, Blood, Order for future visit, Stop date 08/04/20 10:25:00 CDT, Lab Collect, DM (diabetes mellitus)  HYPERTENSION  Hypercholesterolemia, 08/04/20 10:25:00 CDT  Lab Collection Request, 08/04/20 10:25:00 CDT, [facility] AMB - AFP, 08/04/20 10:25:00 CDT, DM (diabetes mellitus)  HYPERTENSION   Hypercholesterolemia  Vitamin D deficiency  Lipid Panel, Routine collect, 08/04/20 10:25:00 CDT, Blood, Order for future visit, Stop date 08/04/20 10:25:00 CDT, Lab Collect, Hypercholesterolemia, 08/04/20 10:25:00 CDT  Office/Outpatient Visit Level 4 Established [id number] PC, DM (diabetes mellitus)  HYPERTENSION  Hypercholesterolemia  Vitamin D deficiency, [facility] AM[facility] - AFP, 08/04/20 10:26:00 CDT  ?  4.?Vitamin D deficiency?E55.9  ?check vit D level, continue supplementation  Ordered:  Lab Collection Request, 08/04/20 10:25:00 CDT, [facility] AMB - AFP, 08/04/20 10:25:00 CDT, DM (diabetes mellitus)  HYPERTENSION  Hypercholesterolemia  Vitamin D deficiency  Office/Outpatient Visit Level 4 Established [id number] PC, DM (diabetes mellitus)  HYPERTENSION  Hypercholesterolemia  Vitamin D deficiency, [facility] AM[facility] - AFP, 08/04/20 10:26:00 CDT  Vitamin D, 25-Hydroxy Level, Routine collect, 08/04/20 10:25:00 CDT, Blood, Order for future visit, Stop date 08/04/20 10:25:00 CDT, Lab Collect, Vitamin D deficiency, 08/04/20 10:25:00 CDT  ?  5.?Encounter for immunization?Z23  ?consider prevnar today  Ordered:  pneumococcal 13-valent conjugate vaccine, 0.5 mL, form: Injection, IM, Once, first dose 08/04/20 10:25:00 CDT, stop date 08/04/20 10:25:00 CDT  ?  6.?Antibody response exam?Z01.84  ?sars IgG --patient request  Ordered:  SARS CoV2 IgG Ab, Routine collect, 08/04/20 10:38:00 CDT, Blood, Order for future visit, Stop date 08/04/20 10:38:00 CDT, Lab Collect, Antibody response exam, 08/04/20 10:38:00 CDT  ?  --CMP, FLP, A1C, Vit D, sars IgG  Referrals  Clinic Follow up, *Est. 02/04/21 3:00:00 CST, Order for future visit, Wellness examination  DM (diabetes mellitus)  HYPERTENSION, [facility] AFP   Problem List/Past Medical History  Ongoing  Acute UTI  Depression  DM (diabetes mellitus)  GERD - Gastro-esophageal reflux disease  Hypercholesterolemia  HYPERTENSION  Knowledge deficit  Left foot pain  Podagra  Sleep apnea  Urinary  incontinence  Vitamin D deficiency  Wellness examination  Historical  Allergic rhinitis  Obstructive sleep apnea of adult  Overactive bladder  Procedure/Surgical History  Colonoscopy (04/02/2019)  Colonoscopy (12.2013)  Tonsillectomy (1970)  Bilateral tubal ligation  Coronary angiogram   Medications  Flonase 50 mcg/inh nasal spray, 1 spray(s), Nasal, BID, 11 refills  hydrochlorothiazide-lisinopril 25 mg-20 mg oral tablet, See Instructions  indomethacin 50 mg oral capsule, See Instructions  metFORMIN 1000 mg oral tablet, See Instructions  Misc Prescription, See Instructions  Misc Prescription, See Instructions, 11 refills  pravastatin 10 mg oral tablet, 10 mg= 1 tab(s), Oral, Once a day (at bedtime), 2 refills  Prevnar 13, 0.5 mL, IM, Once  solifenacin 10 mg oral tablet, 10 mg= 1 tab(s), Oral, Daily, 1 refills  TRUE METRIX BLOOD GLUCOSE TEST STRP, See Instructions, 5 refills  True Metrix Lancets, See Instructions, 11 refills  Vitamin D3 2000 intl units oral tablet, 2000 IntUnit= 1 tab(s), Oral, Daily  Zinc, 140 mg, Oral, Daily  Zyrtec 10 mg oral tablet, 10 mg= 1 tab(s), Oral, Daily  Allergies  No Known Allergies  Social History  Abuse/Neglect  No, 08/04/2020  No, 02/04/2020  No, 06/18/2019  Alcohol - Low Risk, 12/02/2012  Current, 1-2 times per month, 03/06/2018  Employment/School  Work/School description: homemaker., 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  Diabetes mellitus: Mother and Daughter.  Gastric bypass: Daughter.  Pacemaker rhythm: Mother.  Immunizations  Vaccine Date Status   zoster vaccine live 06/2014 Recorded    pneumococcal 23-polyvalent vaccine 01/2013 Recorded   Health Maintenance  Health Maintenance  ???Pending?(in the next year)  ??? ??OverDue  ??? ? ? ?Diabetes Maintenance-Microalbumin due??and every?  ??? ? ? ?Advance Directive due??01/02/20??and every 1??year(s)  ??? ? ? ?Alcohol Misuse Screening due??01/02/20??and every 1??year(s)  ??? ? ? ?Cognitive Screening due??01/02/20??and every 1??year(s)  ??? ? ? ?Fall Risk Assessment due??01/02/20??and every 1??year(s)  ??? ? ? ?Functional Assessment due??01/02/20??and every 1??year(s)  ??? ??Due?  ??? ? ? ?ADL Screening due??08/04/20??and every 1??year(s)  ??? ? ? ?Bone Density Screening due??08/04/20??Variable frequency  ??? ? ? ?Depression Screening due??08/04/20??and every?  ??? ? ? ?Influenza Vaccine due??08/04/20??and every?  ??? ? ? ?Pneumococcal Vaccine due??08/04/20??and every?  ??? ? ? ?Tetanus Vaccine due??08/04/20??and every 10??year(s)  ??? ? ? ?Zoster Vaccine due??08/04/20??and every?  ??? ??Due In Future?  ??? ? ? ?Obesity Screening not due until??01/01/21??and every 1??year(s)  ??? ? ? ?Medicare Annual Wellness Exam not due until??02/04/21??and every 1??year(s)  ??? ? ? ?Diabetes Maintenance-Eye Exam not due until??02/05/21??and every 1??year(s)  ??? ? ? ?Diabetes Maintenance-HgbA1c not due until??02/09/21??and every 1??year(s)  ??? ? ? ?Diabetes Maintenance-Fasting Lipid Profile not due until??02/10/21??and every 1??year(s)  ??? ? ? ?Hypertension Management-BMP not due until??03/20/21??and every 1??year(s)  ??? ? ? ?Diabetes Maintenance-Serum Creatinine not due until??03/20/21??and every 1??year(s)  ??? ? ? ?Diabetes Maintenance-Medication Prescribed not due until??04/15/21??and every 1??year(s)  ???Satisfied?(in the past 1 year)  ??? ??Satisfied?  ??? ? ? ?Aspirin Therapy for CVD Prevention on??08/04/20.??Satisfied by Nabil Persaud MD  ??? ? ? ?Blood Pressure Screening on??08/04/20.??Satisfied by Marisela Puga CMA  ??? ? ? ?Body Mass Index Check  on??08/04/20.??Satisfied by Marisela Puga CMA  ??? ? ? ?Breast Cancer Screening on??02/17/20.??Satisfied by Jillian Fregoso  ??? ? ? ?Diabetes Maintenance-Eye Exam on??02/06/20.??Satisfied by Jillian Fregoso  ??? ? ? ?Diabetes Maintenance-Fasting Lipid Profile on??02/10/20.??Satisfied by Arlin Pgua  ??? ? ? ?Diabetes Maintenance-Foot Exam on??08/04/20.??Satisfied by Nabil Persaud MD  ??? ? ? ?Diabetes Maintenance-HgbA1c on??02/10/20.??Satisfied by Niranjan Rizzo  ??? ? ? ?Diabetes Maintenance-Medication Prescribed on??04/15/20.??Satisfied by Nabil Persaud MD  ??? ? ? ?Diabetes Maintenance-Serum Creatinine on??03/20/20.??Satisfied by Niranjan Rizzo  ??? ? ? ?Diabetes Screening on??03/20/20.??Satisfied by Niranjan Rizzo  ??? ? ? ?Hypertension Management-BMP on??03/20/20.??Satisfied by Niranjan Rizzo  ??? ? ? ?Hypertension Management-Blood Pressure on??08/04/20.??Satisfied by Marisela Puga CMA  ??? ? ? ?Influenza Vaccine on??02/04/20.??Satisfied by Marisela Puga CMA  ??? ? ? ?Lipid Screening on??02/10/20.??Satisfied by Arlin Puga  ??? ? ? ?Medicare Annual Wellness Exam on??02/04/20.??Satisfied by Nabil Persaud MD  ??? ? ? ?Obesity Screening on??08/04/20.??Satisfied by Marisela Puga CMA  ??? ??Refused?  ??? ? ? ?Pneumococcal Vaccine on??02/04/20.??Recorded by Nabil Persaud MD??Reason: Patient Refuses  ?

## 2022-05-02 NOTE — HISTORICAL [facility]
This is a historical note converted from [facility]. Formatting and pictures may have been removed.  Please reference Celina for original formatting and attached multimedia. Chief Complaint  FOOT PAIN X 3 WEEKS IN RIGHT FOOT. ?POSSIBLE GOUT.  History of Present Illness  Patient is here today with complaints of?pain?at her first MTP joint?for the past several?weeks. ?She went to the walk-in clinic?and was given a cortisone?2 weeks ago?which has not helped.? She does not know?what triggered?the attack. ?She has had no major changes in her diet.? Only new medication is?generic Vesicare.? She reports sensitivity with her?foot touching the sheets at night.? No history of gout in the past.? She reports that she also got a cortisone shot?1 week prior to?her gout visit at the walk-in clinic due to an upper respiratory tract infection.  Review of Systems  General:?No fever  Right great foot:?Warmth and redness?and tenderness at?base of great toe, sensitive?to sheets at night and walking.? Pain is worse at night difficulty sleeping  Physical Exam  Vitals & Measurements  HR:?74(Peripheral)? BP:?128/80?  HT:?158.7?cm? WT:?89.4?kg? BMI:?35.5?  right foot--+podagra,?increased warmth and redness in area--+2 dorsal pedis and posterior tibial pulse. ?No skin lesions.  Assessment/Plan  1.?Gout?M10.9  Right podagra--Indocin 50 mg tid, add otc pepcid , colchicine 0.6 bid 2 po day 1 then 1 po bid prn. Norco 7.5 qhs?dispense 20--low purine diet  Check uric acid and?CMP  Ordered:  Comprehensive Metabolic Panel, Routine collect, 03/20/20 10:13:00 CDT, Blood, Order for future visit, Stop date 03/20/20 10:13:00 CDT, Lab Collect, Gout, 03/20/20 10:13:00 CDT  Lab Collection Request, 03/20/20 10:13:00 CDT, [facility] AMB - AFP, 03/20/20 10:13:00 CDT, Gout  Uric Acid, Routine collect, 03/20/20 10:13:00 CDT, Blood, Order for future visit, Stop date 03/20/20 10:13:00 CDT, Lab Collect, Gout, 03/20/20 10:13:00 CDT  ?  Orders:  acetaminophen-HYDROcodone, 1  tab(s), Oral, q4hr, PRN PRN as needed for pain, # 20 tab(s), 0 Refill(s), Pharmacy: [id number], 158.7, cm, Height/Length Dosing, 03/20/20 9:46:00 CDT, 89.4, kg, Weight Dosing, 03/20/20 9:46:00 CDT  colchicine, 0.6 mg = 1 cap(s), Oral, BID, # 60 cap(s), 0 Refill(s), Pharmacy: [facility] #[id number], 158.7, cm, Height/Length Dosing, 03/20/20 9:46:00 CDT, 89.4, kg, Weight Dosing, 03/20/20 9:46:00 CDT  indomethacin, 50 mg = 1 cap(s), Oral, TID, PRN PRN for gout, # 30 cap(s), 0 Refill(s), Pharmacy: [facility] #[id number], 158.7, cm, Height/Length Dosing, 03/20/20 9:46:00 CDT, 89.4, kg, Weight Dosing, 03/20/20 9:46:00 CDT   Problem List/Past Medical History  Ongoing  Acute UTI  Depression  DM (diabetes mellitus)  GERD - Gastro-esophageal reflux disease  Hypercholesterolemia  HYPERTENSION  Knowledge deficit  Left foot pain  Podagra  Sleep apnea  Urinary incontinence  Vitamin D deficiency  Wellness examination  Historical  Allergic rhinitis  Obstructive sleep apnea of adult  Overactive bladder  Procedure/Surgical History  Colonoscopy (04/02/2019)  Colonoscopy (12.2013)  Tonsillectomy (1970)  Bilateral tubal ligation  Coronary angiogram   Medications  colchicine 0.6 mg oral capsule, 0.6 mg= 1 cap(s), Oral, BID  glipiZIDE 5 mg oral tablet, 5 mg= 1 tab(s), Oral, Daily  hydrochlorothiazide-lisinopril 25 mg-20 mg oral tablet, See Instructions  indomethacin 50 mg oral capsule, 50 mg= 1 cap(s), Oral, TID, PRN  metFORMIN 1000 mg oral tablet, See Instructions  Misc Prescription, See Instructions  Misc Prescription, See Instructions, 11 refills  Norco 7.5 mg-325 mg oral tablet, 1 tab(s), Oral, q4hr, PRN  pravastatin 10 mg oral tablet, 10 mg= 1 tab(s), Oral, Once a day (at bedtime), 2 refills  TRUE METRIX BLOOD GLUCOSE TEST STRP, See Instructions, 5 refills  True Metrix Lancets, See Instructions, 11 refills  VESIcare 5 mg oral tablet, 5 mg= 1 tab(s), Oral, Daily, 3 refills  Vitamin D3 2000 intl units oral  tablet, 2000 IntUnit= 1 tab(s), Oral, Daily  Allergies  No Known Allergies  Social History  Abuse/Neglect  No, 02/04/2020  No, 06/18/2019  Alcohol - Low Risk, 12/02/2012  Current, 1-2 times per month, 03/06/2018  Employment/School  Work/School description: homemaker., 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  Diabetes mellitus: Mother and Daughter.  Gastric bypass: Daughter.  Pacemaker rhythm: Mother.  Immunizations  Vaccine Date Status   zoster vaccine live 06/2014 Recorded   pneumococcal 23-polyvalent vaccine 01/2013 Recorded   Health Maintenance  Health Maintenance  ???Pending?(in the next year)  ??? ??OverDue  ??? ? ? ?Pneumococcal Vaccine due??and every?  ??? ? ? ?Diabetes Maintenance-Microalbumin due??09/17/19??and every 1??year(s)  ??? ? ? ?Advance Directive due??01/01/20??and every 1??year(s)  ??? ? ? ?Alcohol Misuse Screening due??01/01/20??and every 1??year(s)  ??? ? ? ?Cognitive Screening due??01/01/20??and every 1??year(s)  ??? ? ? ?Fall Risk Assessment due??01/01/20??and every 1??year(s)  ??? ? ? ?Functional Assessment due??01/01/20??and every 1??year(s)  ??? ? ? ?Geriatric Depression Screening due??01/01/20??and every 1??year(s)  ??? ??Due?  ??? ? ? ?ADL Screening due??03/20/20??and every 1??year(s)  ??? ? ? ?Aspirin Therapy for CVD Prevention due??03/20/20??and every 1??year(s)  ??? ? ? ?Bone Density Screening due??03/20/20??Variable frequency  ??? ? ? ?Tetanus Vaccine due??03/20/20??and every 10??year(s)  ??? ??Refused?  ??? ? ? ?Pneumococcal Vaccine due??03/20/20??Variable frequency  ??? ??Due In Future?  ??? ? ? ?Diabetes Maintenance-Foot Exam not due  until??05/19/20??and every 1??year(s)  ??? ? ? ?Obesity Screening not due until??01/01/21??and every 1??year(s)  ??? ? ? ?Diabetes Maintenance-Medication Prescribed not due until??01/13/21??and every 1??year(s)  ??? ? ? ?Medicare Annual Wellness Exam not due until??02/04/21??and every 1??year(s)  ??? ? ? ?Diabetes Maintenance-Eye Exam not due until??02/05/21??and every 1??year(s)  ??? ? ? ?Diabetes Maintenance-Fasting Lipid Profile not due until??02/09/21??and every 1??year(s)  ??? ? ? ?Diabetes Maintenance-HgbA1c not due until??02/09/21??and every 1??year(s)  ??? ? ? ?Hypertension Management-BMP not due until??02/09/21??and every 1??year(s)  ??? ? ? ?Diabetes Maintenance-Serum Creatinine not due until??02/10/21??and every 1??year(s)  ??? ? ? ?Diabetes Maintenance-Urine Dipstick not due until??02/10/21??and every 1??year(s)  ???Satisfied?(in the past 1 year)  ??? ??Satisfied?  ??? ? ? ?Blood Pressure Screening on??03/20/20.??Satisfied by Marisela Puga CMA  ??? ? ? ?Body Mass Index Check on??03/20/20.??Satisfied by Marisela Puga CMA.  ??? ? ? ?Breast Cancer Screening on??02/17/20.??Satisfied by Jillian Fregoso  ??? ? ? ?Breast Cancer Screening ([facility]) on??02/17/20.??Satisfied by Jillian Fregoso  ??? ? ? ?Colorectal Screening on??04/02/19.??Satisfied by Szuy Durand  ??? ? ? ?Colorectal Screening (Senior Wellness) on??04/02/19.??Satisfied by Suzy Durand  ??? ? ? ?Diabetes Maintenance-Eye Exam on??02/06/20.??Satisfied by Jillian Fregoso  ??? ? ? ?Diabetes Maintenance-Fasting Lipid Profile on??02/10/20.??Satisfied by Arlin Puga  ??? ? ? ?Diabetes Maintenance-Foot Exam on??05/20/19.??Satisfied by Nabil Persaud MD  ??? ? ? ?Diabetes Maintenance-HgbA1c on??02/10/20.??Satisfied by Niranjan Rizzo  ??? ? ? ?Diabetes Maintenance-Medication Prescribed on??01/13/20.??Satisfied by Nabil Persaud MD  ??? ? ? ?Diabetes Maintenance-Serum Creatinine on??02/10/20.??Satisfied by Arlin Puga  RADHAMES  ??? ? ? ?Diabetes Maintenance-Urine Dipstick on??02/10/20.??Satisfied by Niranjan Rizzo  ??? ? ? ?Diabetes Screening on??02/10/20.??Satisfied by Arlin Puga  ??? ? ? ?Hypertension Management-Blood Pressure on??03/20/20.??Satisfied by Marisela Puga CMA.  ??? ? ? ?Hypertension Management-BMP on??02/10/20.??Satisfied by Arlin Puga  ??? ? ? ?Influenza Vaccine on??02/04/20.??Satisfied by Marisela Puga CMA.  ??? ? ? ?Lipid Screening on??02/10/20.??Satisfied by Arlin Puga  ??? ? ? ?Medicare Annual Wellness Exam on??02/04/20.??Satisfied by Nabil Persaud MD  ??? ? ? ?Obesity Screening on??03/20/20.??Satisfied by Marisela Puga CMA  ??? ??Refused?  ??? ? ? ?Pneumococcal Vaccine on??02/04/20.??Recorded by Nabil Persaud MD??Reason: Patient Refuses  ?

## 2022-09-07 PROBLEM — K21.9 GASTROESOPHAGEAL REFLUX DISEASE: Status: ACTIVE | Noted: 2022-09-07

## 2022-09-07 PROBLEM — N32.81 OAB (OVERACTIVE BLADDER): Status: ACTIVE | Noted: 2022-09-07

## 2022-09-07 PROBLEM — I10 HYPERTENSION: Status: ACTIVE | Noted: 2022-09-07

## 2022-09-07 PROBLEM — F32.A DEPRESSIVE DISORDER: Status: ACTIVE | Noted: 2022-09-07

## 2022-09-07 PROBLEM — E55.9 VITAMIN D DEFICIENCY: Status: ACTIVE | Noted: 2022-09-07

## 2022-09-07 PROBLEM — Z23 IMMUNIZATION DUE: Status: ACTIVE | Noted: 2022-09-07

## 2022-09-07 PROBLEM — E11.9 DIABETES MELLITUS: Status: ACTIVE | Noted: 2022-09-07

## 2022-09-07 PROBLEM — G47.33 OSA ON CPAP: Status: ACTIVE | Noted: 2022-09-07

## 2022-09-07 PROBLEM — G47.30 SLEEP APNEA: Status: ACTIVE | Noted: 2022-09-07

## 2022-09-07 PROBLEM — E78.00 HYPERCHOLESTEROLEMIA: Status: ACTIVE | Noted: 2022-09-07

## 2022-09-07 PROBLEM — G47.30 SLEEP APNEA: Status: RESOLVED | Noted: 2022-09-07 | Resolved: 2022-09-07

## 2022-09-07 PROBLEM — E66.01 MORBID (SEVERE) OBESITY DUE TO EXCESS CALORIES: Status: ACTIVE | Noted: 2022-09-07

## 2022-09-27 PROBLEM — N39.0 URINARY TRACT INFECTION WITHOUT HEMATURIA: Status: ACTIVE | Noted: 2022-09-27

## 2022-09-28 PROCEDURE — 87088 URINE BACTERIA CULTURE: CPT | Performed by: FAMILY MEDICINE

## 2023-01-02 PROBLEM — N39.0 URINARY TRACT INFECTION WITHOUT HEMATURIA: Status: RESOLVED | Noted: 2022-09-27 | Resolved: 2023-01-02

## 2023-01-11 ENCOUNTER — HOSPITAL ENCOUNTER (OUTPATIENT)
Dept: RADIOLOGY | Facility: CLINIC | Age: 68
Discharge: HOME | End: 2023-01-11
Attending: REHABILITATION UNIT
Payer: MEDICARE

## 2023-01-11 ENCOUNTER — OFFICE VISIT (OUTPATIENT)
Dept: ORTHOPEDICS | Facility: CLINIC | Age: 68
End: 2023-01-11
Payer: MEDICARE

## 2023-01-11 VITALS
SYSTOLIC BLOOD PRESSURE: 130 MMHG | HEIGHT: 62 IN | WEIGHT: 183 LBS | BODY MASS INDEX: 33.68 KG/M2 | HEART RATE: 66 BPM | DIASTOLIC BLOOD PRESSURE: 86 MMHG

## 2023-01-11 DIAGNOSIS — M25.561 RIGHT KNEE PAIN, UNSPECIFIED CHRONICITY: ICD-10-CM

## 2023-01-11 DIAGNOSIS — M25.522 LEFT ELBOW PAIN: ICD-10-CM

## 2023-01-11 DIAGNOSIS — S82.091A OTHER CLOSED FRACTURE OF RIGHT PATELLA, INITIAL ENCOUNTER: Primary | ICD-10-CM

## 2023-01-11 PROCEDURE — 99204 PR OFFICE/OUTPT VISIT, NEW, LEVL IV, 45-59 MIN: ICD-10-PCS | Mod: 57,,, | Performed by: REHABILITATION UNIT

## 2023-01-11 PROCEDURE — 27520 PR CLOSED RX PATELLA FX: ICD-10-PCS | Mod: RT,,, | Performed by: REHABILITATION UNIT

## 2023-01-11 PROCEDURE — 27520 TREAT KNEECAP FRACTURE: CPT | Mod: RT,,, | Performed by: REHABILITATION UNIT

## 2023-01-11 PROCEDURE — 73080 XR ELBOW COMPLETE 3 VIEW LEFT: ICD-10-PCS | Mod: LT,,, | Performed by: REHABILITATION UNIT

## 2023-01-11 PROCEDURE — 73080 X-RAY EXAM OF ELBOW: CPT | Mod: LT,,, | Performed by: REHABILITATION UNIT

## 2023-01-11 PROCEDURE — 73560 XR KNEE 1 OR 2 VIEW RIGHT: ICD-10-PCS | Mod: RT,,, | Performed by: REHABILITATION UNIT

## 2023-01-11 PROCEDURE — 73560 X-RAY EXAM OF KNEE 1 OR 2: CPT | Mod: RT,,, | Performed by: REHABILITATION UNIT

## 2023-01-11 PROCEDURE — 99204 OFFICE O/P NEW MOD 45 MIN: CPT | Mod: 57,,, | Performed by: REHABILITATION UNIT

## 2023-01-11 NOTE — PROGRESS NOTES
Subjective:      Patient ID: Mercedes Schumacher is a 67 y.o. female.    Chief Complaint: Pain of the Right Knee (Right knee pain. Pt fell 1/7/23. Pt's driveway drops and she stepped down and fell. Able to move knee. Dr. Griffiths [facility] put her in brace due to a bone spur. ) and Pain of the Left Elbow (Left elbow pain. In splint. Elbow is bruised and is in constant pain. Left hand is very swollen and pt states her arm feels like it's been swollen. )    HPI:   Mercedes Schumacher is a 67 y.o. female who presents today for initial evaluation of her right knee and left elbow. She is accompanied by two family members. She was seen at [facility] after a fall on 1/7/23. She reports a fracture to her knee. She was placed into a brace and has been compliant with this. She was also placed into a splint to the LUE. She reports pain and swelling that extends into her hand. Pain to knee is much improved. No sensory changes. Pain worse with movement and better with rest.     Past Medical History:   Diagnosis Date    Depression     DM (diabetes mellitus)     GERD (gastroesophageal reflux disease)     HTN (hypertension)     Hypercholesterolemia     ALLI (obstructive sleep apnea)     Other seasonal allergic rhinitis     Overactive bladder     Statin intolerance     Vitamin D deficiency      Past Surgical History:   Procedure Laterality Date    BILATERAL TUBAL LIGATION      COLONOSCOPY  04/02/2019    repeat 5 years, Dr. Langley    LEFT HEART CATHETERIZATION N/A 09/09/2022    Dr Cruz    TONSILLECTOMY  1970    TUBAL LIGATION  03/02/1986     Social History     Socioeconomic History    Marital status:     Number of children: 3   Occupational History    Occupation: Retired   Tobacco Use    Smoking status: Never    Smokeless tobacco: Never   Substance and Sexual Activity    Alcohol use: Never    Drug use: Never    Sexual activity: Yes     Partners: Male     Birth control/protection: Post-menopausal         Current Outpatient Medications:  "    dulaglutide (TRULICITY) 0.75 mg/0.5 mL pen injector, Inject 0.75 mg into the skin every 7 days., Disp: 4 pen, Rfl: 5    hydroCHLOROthiazide (HYDRODIURIL) 25 MG tablet, TAKE 1 TABLET BY MOUTH DAILY, Disp: 90 tablet, Rfl: 1    lisinopriL (PRINIVIL,ZESTRIL) 20 MG tablet, TAKE 1 TABLET BY MOUTH TWICE DAILY, Disp: 180 tablet, Rfl: 1    metFORMIN (GLUCOPHAGE) 1000 MG tablet, Take 1 tablet (1,000 mg total) by mouth 2 (two) times a day., Disp: 180 tablet, Rfl: 1    multivitamin capsule, Take 1 capsule by mouth once daily., Disp: , Rfl:     oxybutynin (DITROPAN) 5 MG Tab, Take 5 mg by mouth 3 (three) times daily., Disp: , Rfl:     potassium chloride SA (K-DUR,KLOR-CON) 20 MEQ tablet, Take 20 mEq by mouth 2 (two) times daily., Disp: , Rfl:     rosuvastatin (CRESTOR) 10 MG tablet, Take 1 tablet (10 mg total) by mouth every evening., Disp: 30 tablet, Rfl: 5    TOPROL XL 50 mg 24 hr tablet, Take 50 mg by mouth once daily., Disp: , Rfl:     ZYRTEC 10 mg tablet, Take 10 mg by mouth once daily., Disp: , Rfl:   Review of patient's allergies indicates:  No Known Allergies    /86   Pulse 66   Ht 5' 2" (1.575 m)   Wt 83 kg (183 lb)   BMI 33.47 kg/m²     Comprehensive review of systems completed and negative except as per HPI.        Objective:   Head: Normocephalic, without obvious abnormality, atraumatic  Eyes: conjunctivae/corneas clear. EOM's intact  Ears: normal external appearance  Nose: Nares normal. Septum midline. Mucosa normal. No drainage  Throat: normal findings: lips normal without lesions  Lungs: unlabored breathing on room air  Chest wall: symmetric chest rise  Heart: regular rate and rhythm  Pulses: 2+ and symmetric  Skin: Skin color, texture, turgor normal. No rashes or lesions  Neurologic: Grossly normal    right KNEE:     Alignment: neutral  Appearance: skin in intact without lesion  Effusion: none  Gait: rosa  Straight Leg Raise: negative  Log Roll: negative  Hip ROM: full and painless  Ankle ROM: full " and painless   Knee ROM:  0 - 130  Tenderness: minimal anteriorly  Patellar Mobility: normal  Patellar grind: negative  J Sign: negative  PF Crepitus: negative        Bernard Test: negative   Valgus Stress @ 0 deg: stable  Valgus Stress @ 30 deg: stable  Varus Stress @ 0 deg: stable  Varus Stress @ 30 deg: stable  Lachman: stable  Ant Drawer: negative   Post Drawer: negative  Posterior Sag Sign: negative  Neurological deficits: SILT dp/sp/t distributions  Motor: 5/5 EHL/FHL/TA/GS    Extensor mechanism intact     Warm well perfused lower extremity with capillary refill less than 2 seconds and sensation intact to light touch in the terminal nerve distributions. Calf soft and easily compressible without clinical sign of DVT. No palpable popliteal lymphadenopathy    LUE:  Skin is intact. Mild swelling and ecchymosis about the elbow. No discrete bony ttp. Full ROM. NVI distally    Assessment:     Imaging:   Two view radiographs of the right knee obtained today personally reviewed showing small fracture of an enthesophyte/osteophyte of the superior patella with no dislocations. Joint spaces are well maintained. No gross malalignment.     Three view radiographs of the left elbow obtained today personally reviewed showing no acute fractures or dislocations. Joint spaces intact.       1. Other closed fracture of right patella, initial encounter    2. Right knee pain, unspecified chronicity    3. Left elbow pain          Plan:       Orders Placed This Encounter    X-Ray Elbow Complete Left    X-Ray Knee 1 or 2 View Right     Imaging and exam findings discussed. Fall resulted in fracture of a superior entehsophyte of the patella. Extensor mechanism is intact. She was fit for a hinged knee brace. WBAT with brace. Gentle ROM allowed while seated. Contusion to L elbow. No immobilization. Resume activity. F/y 4-6 wks with R knee films for her fracture care. All of their questions were answered and they were in agreement.

## 2023-03-08 ENCOUNTER — OFFICE VISIT (OUTPATIENT)
Dept: ORTHOPEDICS | Facility: CLINIC | Age: 68
End: 2023-03-08
Payer: MEDICARE

## 2023-03-08 ENCOUNTER — HOSPITAL ENCOUNTER (OUTPATIENT)
Dept: RADIOLOGY | Facility: CLINIC | Age: 68
Discharge: HOME | End: 2023-03-08
Attending: REHABILITATION UNIT
Payer: MEDICARE

## 2023-03-08 VITALS
DIASTOLIC BLOOD PRESSURE: 90 MMHG | HEART RATE: 73 BPM | HEIGHT: 62 IN | SYSTOLIC BLOOD PRESSURE: 126 MMHG | WEIGHT: 180 LBS | BODY MASS INDEX: 33.13 KG/M2

## 2023-03-08 DIAGNOSIS — S82.091D OTHER CLOSED FRACTURE OF RIGHT PATELLA WITH ROUTINE HEALING, SUBSEQUENT ENCOUNTER: Primary | ICD-10-CM

## 2023-03-08 DIAGNOSIS — M25.561 RIGHT KNEE PAIN, UNSPECIFIED CHRONICITY: ICD-10-CM

## 2023-03-08 PROBLEM — Z00.00 ENCOUNTER FOR WELLNESS EXAMINATION IN ADULT: Status: ACTIVE | Noted: 2023-03-08

## 2023-03-08 PROCEDURE — 73564 XR KNEE COMP 4 OR MORE VIEWS RIGHT: ICD-10-PCS | Mod: RT,,, | Performed by: REHABILITATION UNIT

## 2023-03-08 PROCEDURE — 99024 PR POST-OP FOLLOW-UP VISIT: ICD-10-PCS | Mod: POP,,, | Performed by: REHABILITATION UNIT

## 2023-03-08 PROCEDURE — 99024 POSTOP FOLLOW-UP VISIT: CPT | Mod: POP,,, | Performed by: REHABILITATION UNIT

## 2023-03-08 PROCEDURE — 73564 X-RAY EXAM KNEE 4 OR MORE: CPT | Mod: RT,,, | Performed by: REHABILITATION UNIT

## 2023-03-08 NOTE — PROGRESS NOTES
Subjective:      Patient ID: Mercedes Schumacher is a 67 y.o. female.    Chief Complaint: Follow-up (f/u for RT knee and  LT elbow fx. no complaints of pain. complaints of pain in RT knee since her fall. no swelling. not taking any meds. )    HPI:   Mercedes Schumacher is a 67 y.o. female who presents today for follow up evaluation of her right knee and left elbow. She is doing very well overall. She has no LUE complaints. Full and painless ROM with full strength. No R knee pain. She has her brace with her but has not been wearing it much. No sensorimotor changes. She is able to perform ADLs. She does report her left knee is bothering her more than anything else.     Initial HPI:  She was seen at [facility] after a fall on 1/7/23. She reports a fracture to her knee. She was placed into a brace and has been compliant with this. She was also placed into a splint to the LUE. She reports pain and swelling that extends into her hand. Pain to knee is much improved. No sensory changes. Pain worse with movement and better with rest.     Past Medical History:   Diagnosis Date    Depression     DM (diabetes mellitus)     GERD (gastroesophageal reflux disease)     HTN (hypertension)     Hypercholesterolemia     ALLI (obstructive sleep apnea)     Other seasonal allergic rhinitis     Overactive bladder     Statin intolerance     Vitamin D deficiency      Past Surgical History:   Procedure Laterality Date    BILATERAL TUBAL LIGATION      COLONOSCOPY  04/02/2019    repeat 5 years, Dr. Langley    LEFT HEART CATHETERIZATION N/A 09/09/2022    Dr Cruz    TONSILLECTOMY  1970    TUBAL LIGATION  03/02/1986     Social History     Socioeconomic History    Marital status:     Number of children: 3   Occupational History    Occupation: Retired   Tobacco Use    Smoking status: Never    Smokeless tobacco: Never   Substance and Sexual Activity    Alcohol use: Never    Drug use: Never    Sexual activity: Yes     Partners: Male     Birth  "control/protection: Post-menopausal         Current Outpatient Medications:     metFORMIN (GLUCOPHAGE) 1000 MG tablet, Take 1 tablet (1,000 mg total) by mouth 2 (two) times a day., Disp: 180 tablet, Rfl: 1    multivitamin capsule, Take 1 capsule by mouth once daily., Disp: , Rfl:     potassium chloride SA (K-DUR,KLOR-CON) 20 MEQ tablet, Take 20 mEq by mouth 2 (two) times daily., Disp: , Rfl:     rosuvastatin (CRESTOR) 10 MG tablet, Take 1 tablet (10 mg total) by mouth every evening., Disp: 30 tablet, Rfl: 5    TOPROL XL 50 mg 24 hr tablet, Take 50 mg by mouth once daily., Disp: , Rfl:     ZYRTEC 10 mg tablet, Take 10 mg by mouth once daily., Disp: , Rfl:     dulaglutide (TRULICITY) 0.75 mg/0.5 mL pen injector, Inject 0.75 mg into the skin every 7 days., Disp: 4 pen, Rfl: 5    hydroCHLOROthiazide (HYDRODIURIL) 25 MG tablet, TAKE 1 TABLET BY MOUTH DAILY, Disp: 90 tablet, Rfl: 1    lisinopriL (PRINIVIL,ZESTRIL) 20 MG tablet, TAKE 1 TABLET BY MOUTH TWICE DAILY, Disp: 180 tablet, Rfl: 1    oxybutynin (DITROPAN) 5 MG Tab, Take 5 mg by mouth 3 (three) times daily., Disp: , Rfl:   Review of patient's allergies indicates:  No Known Allergies    BP (!) 126/90   Pulse 73   Ht 5' 2" (1.575 m)   Wt 81.6 kg (180 lb)   BMI 32.92 kg/m²     Comprehensive review of systems completed and negative except as per HPI.        Objective:   Head: Normocephalic, without obvious abnormality, atraumatic  Eyes: conjunctivae/corneas clear. EOM's intact  Ears: normal external appearance  Nose: Nares normal. Septum midline. Mucosa normal. No drainage  Throat: normal findings: lips normal without lesions  Lungs: unlabored breathing on room air  Chest wall: symmetric chest rise  Heart: regular rate and rhythm  Pulses: 2+ and symmetric  Skin: Skin color, texture, turgor normal. No rashes or lesions  Neurologic: Grossly normal    right KNEE:     Alignment: neutral  Appearance: skin in intact without lesion  Effusion: none  Gait: nl   Straight Leg " Raise: negative  Log Roll: negative  Hip ROM: full and painless  Ankle ROM: full and painless   Knee ROM:  0 - 130  Tenderness: none  Patellar Mobility: normal  Patellar grind: negative  J Sign: negative  PF Crepitus: negative        Bernard Test: negative   Valgus Stress @ 0 deg: stable  Valgus Stress @ 30 deg: stable  Varus Stress @ 0 deg: stable  Varus Stress @ 30 deg: stable  Lachman: stable  Ant Drawer: negative   Post Drawer: negative  Posterior Sag Sign: negative  Neurological deficits: SILT dp/sp/t distributions  Motor: 5/5 EHL/FHL/TA/GS    Extensor mechanism intact     Warm well perfused lower extremity with capillary refill less than 2 seconds and sensation intact to light touch in the terminal nerve distributions. Calf soft and easily compressible without clinical sign of DVT. No palpable popliteal lymphadenopathy    LUE:  Skin is intact. Mild swelling and ecchymosis about the elbow. No discrete bony ttp. Full ROM. NVI distally    Assessment:     Imaging:   Four view radiographs of the right knee obtained today personally reviewed showing stable appearance of small fracture of an enthesophyte/osteophyte of the superior patella with no dislocations. Advanced tricompartmental OA.     Three view radiographs of the left elbow previously obtained show no acute fractures or dislocations. Joint spaces intact.       1. Other closed fracture of right patella with routine healing, subsequent encounter    2. Right knee pain, unspecified chronicity          Plan:       Orders Placed This Encounter    X-Ray Knee Complete 4 Or More Views Right     Imaging and exam findings discussed. Fall resulted in fracture of a superior entehsophyte of the patella. Extensor mechanism is intact. She has no pain to this area and full function. Radiographs stable. Resume all activites. She does have some left knee pain. Knee films with advanced OA. She may benefit from CSI in the future if desired. Symptomatic treatment for now.  Follow up prn. All of her questions were answered and she was in agreement.

## 2023-06-12 PROBLEM — Z00.00 ENCOUNTER FOR WELLNESS EXAMINATION IN ADULT: Status: RESOLVED | Noted: 2023-03-08 | Resolved: 2023-06-12

## 2023-09-13 PROCEDURE — 86803 HEPATITIS C AB TEST: CPT | Performed by: FAMILY MEDICINE
